# Patient Record
Sex: MALE | Race: WHITE | NOT HISPANIC OR LATINO | Employment: FULL TIME | URBAN - METROPOLITAN AREA
[De-identification: names, ages, dates, MRNs, and addresses within clinical notes are randomized per-mention and may not be internally consistent; named-entity substitution may affect disease eponyms.]

---

## 2017-05-15 ENCOUNTER — HOSPITAL ENCOUNTER (OUTPATIENT)
Dept: CARDIOLOGY | Facility: HOSPITAL | Age: 42
Discharge: HOME | End: 2017-05-15
Attending: INTERNAL MEDICINE | Admitting: INTERNAL MEDICINE

## 2022-06-17 ENCOUNTER — HOSPITAL ENCOUNTER (EMERGENCY)
Facility: HOSPITAL | Age: 47
Discharge: HOME | End: 2022-06-17
Attending: EMERGENCY MEDICINE | Admitting: EMERGENCY MEDICINE

## 2022-06-17 VITALS
SYSTOLIC BLOOD PRESSURE: 137 MMHG | OXYGEN SATURATION: 98 % | WEIGHT: 202.6 LBS | HEIGHT: 69 IN | RESPIRATION RATE: 17 BRPM | DIASTOLIC BLOOD PRESSURE: 101 MMHG | HEART RATE: 98 BPM | BODY MASS INDEX: 30.01 KG/M2 | TEMPERATURE: 98 F

## 2022-06-17 DIAGNOSIS — M51.36 DEGENERATIVE DISC DISEASE, LUMBAR: Primary | ICD-10-CM

## 2022-06-17 DIAGNOSIS — M48.061 SPINAL STENOSIS OF LUMBAR REGION WITHOUT NEUROGENIC CLAUDICATION: ICD-10-CM

## 2022-06-17 PROCEDURE — 99283 EMERGENCY DEPT VISIT LOW MDM: CPT

## 2022-06-17 PROCEDURE — 96372 THER/PROPH/DIAG INJ SC/IM: CPT

## 2022-06-17 PROCEDURE — 25010000002 KETOROLAC TROMETHAMINE PER 15 MG: Performed by: EMERGENCY MEDICINE

## 2022-06-17 RX ORDER — METHOCARBAMOL 500 MG/1
500 TABLET, FILM COATED ORAL 4 TIMES DAILY
Qty: 20 TABLET | Refills: 0 | Status: SHIPPED | OUTPATIENT
Start: 2022-06-17 | End: 2022-06-22

## 2022-06-17 RX ORDER — KETOROLAC TROMETHAMINE 30 MG/ML
60 INJECTION, SOLUTION INTRAMUSCULAR; INTRAVENOUS ONCE
Status: COMPLETED | OUTPATIENT
Start: 2022-06-17 | End: 2022-06-17

## 2022-06-17 RX ORDER — HYDROCODONE BITARTRATE AND ACETAMINOPHEN 7.5; 325 MG/1; MG/1
1 TABLET ORAL EVERY 6 HOURS PRN
Qty: 20 TABLET | Refills: 0 | Status: SHIPPED | OUTPATIENT
Start: 2022-06-17

## 2022-06-17 RX ADMIN — KETOROLAC TROMETHAMINE 60 MG: 30 INJECTION, SOLUTION INTRAMUSCULAR at 19:49

## 2022-06-17 NOTE — DISCHARGE INSTRUCTIONS
Hydrocodone for pain.  Robaxin for spasm and pain.  Also take 600 mg of ibuprofen 3 times a day.  No strenuous activity this weekend.  Call the spine center on Monday morning for a follow-up appointment

## 2022-06-17 NOTE — ED PROVIDER NOTES
Subjective   History of Present Illness  Patient developed right lower back pain about 2 weeks ago but does not recall any trauma or activity that would have aggravated his back.  The patient denies any previous history of chronic back pain.  The patient denies any difficulty with urination there is been no change in bowel or bladder habits.  Today he states that the pain has increased in severity and goes to the right leg.  He is still able to walk but his pain severity has increased.  The patient also states that incidentally he has developed a pain in the left upper back which is worse with a deep breath.  He denies any cough or congestion fever or chills.  There is been no nausea or vomiting.  Denies any pain to the chest.  He has had a previous cholecystectomy.  Patient is not a smoker.  Review of Systems  Negative other than noted above  Past Medical History:   Diagnosis Date   • Hyperlipidemia    • Low back problem        No Known Allergies    Past Surgical History:   Procedure Laterality Date   • CHOLECYSTECTOMY         No family history on file.    Social History     Socioeconomic History   • Marital status: Single   Tobacco Use   • Smoking status: Never Smoker   • Smokeless tobacco: Never Used   Vaping Use   • Vaping Use: Never used   Substance and Sexual Activity   • Alcohol use: Not Currently   • Drug use: Defer   • Sexual activity: Defer           Objective   Physical Exam  He is awake and alert he is afebrile vital signs are stable his sats were 97% the HEENT exam was unremarkable neck is supple and nontender his upper back shows no bruising and there is no tenderness his lungs are clear cardiovascular exam reveals a regular rhythm his abdomen was soft the patient has some tenderness noted to the right lower lumbar area.  His reflexes were 2+ and symmetrical.  He does not have any atrophy or loss of strength  Procedures           ED Course      The patient had [facility] done [facility] that  showed evidence of multilevel degenerative disease and very mild spinal stenosis.  There did not appear to be anything that was emergently or imminently surgical in nature.                                           MDM  The patient will be given a muscle relaxant as well and some hydrocodone to use for pain acutely he is to follow-up with the spine center  Final diagnoses:   Degenerative disc disease, lumbar   Spinal stenosis of lumbar region without neurogenic claudication       ED Disposition  ED Disposition     None          No follow-up provider specified.       Medication List      No changes were made to your prescriptions during this visit.          Mario Alberto Renteria MD  06/17/22 3845

## 2022-06-23 PROBLEM — Z82.49 FAMILY HISTORY OF CORONARY ARTERY DISEASE: Status: ACTIVE | Noted: 2017-04-04

## 2022-06-23 PROBLEM — F41.9 ANXIETY DISORDER: Status: ACTIVE | Noted: 2022-06-23

## 2022-06-23 PROBLEM — E78.5 DYSLIPIDEMIA: Status: ACTIVE | Noted: 2017-04-04

## 2022-06-23 NOTE — PROGRESS NOTES
Neurosurgical Consultation      Tal Spring is a 47 y.o. male is being seen for consultation today at the request of Tenisha Melgar MD for back pain.     Chief Complaint   Patient presents with   • Back Pain     Lower back pain difficult to walk for 2 weeks  Follow up on MRI        Previous treatment:    HPI: This is a 47-year-old gentleman who comments that in general he has a relatively minimal activity in his life who presents with 3 weeks of back pain.  This came on suddenly.  He has difficulty performing any activities as well as lying down and reclining.  He has difficulty gaining comfort.  He is gone to his primary care doctor in the emergency department multiple times.  He is undergone Toradol shots.  He has had steroids and opioid pain medicine as well as muscle relaxers prescribed.  He has not had any physical therapy.  He has not had any spinal canal injections.  He does note intermittent right-sided more than left sided electric-like pain into his leg.  This is not in a specific dermatomal pattern.    Past Medical History:   Diagnosis Date   • Hyperlipidemia    • Low back problem         Past Surgical History:   Procedure Laterality Date   • CHOLECYSTECTOMY          Current Outpatient Medications on File Prior to Visit   Medication Sig Dispense Refill   • HYDROcodone-acetaminophen (NORCO) 7.5-325 MG per tablet Take 1 tablet by mouth Every 6 (Six) Hours As Needed for Moderate Pain  for up to 20 doses. 20 tablet 0   • methocarbamol (ROBAXIN) 500 MG tablet Take 1 tablet by mouth 4 (Four) Times a Day. 20 tablet 0   • methylPREDNISolone (MEDROL) 4 MG dose pack Take as directed on package instructions. 21 tablet 0     No current facility-administered medications on file prior to visit.        No Known Allergies     Social History     Socioeconomic History   • Marital status: Single   Tobacco Use   • Smoking status: Never Smoker   • Smokeless tobacco: Never Used   Vaping Use   • Vaping Use: Never used  "  Substance and Sexual Activity   • Alcohol use: Not Currently   • Drug use: Defer   • Sexual activity: Defer          Review of Systems   Constitutional: Negative.    HENT: Negative.    Eyes: Negative.    Respiratory: Negative.    Cardiovascular: Negative.    Gastrointestinal: Negative.    Endocrine: Negative.    Genitourinary: Negative.    Musculoskeletal: Positive for back pain and gait problem.        Gait issue lower back pain   Allergic/Immunologic: Negative.    Hematological: Negative.    Psychiatric/Behavioral: Negative.         Physical Examination:     Vitals:    06/24/22 1020   BP: 130/87   Pulse: 89   Resp: 16   Temp: 98.1 °F (36.7 °C)   SpO2: 97%   Weight: 89.7 kg (197 lb 12.8 oz)   Height: 175.3 cm (69\")        Physical Exam  Eyes:      General: Lids are normal.      Extraocular Movements: Extraocular movements intact.      Pupils: Pupils are equal, round, and reactive to light.   Neurological:      Deep Tendon Reflexes:      Reflex Scores:       Patellar reflexes are 2+ on the right side and 2+ on the left side.       Achilles reflexes are 2+ on the right side and 2+ on the left side.  Psychiatric:         Speech: Speech normal.          Neurological Exam  Mental Status  Awake, alert and oriented to person, place and time. Speech is normal. Language is fluent with no aphasia.    Cranial Nerves  CN II: Visual acuity is normal. Visual fields full to confrontation.  CN III, IV, VI: Extraocular movements intact bilaterally. Normal lids and orbits bilaterally. Pupils equal round and reactive to light bilaterally.  CN V: Facial sensation is normal.  CN VII: Full and symmetric facial movement.  CN IX, X: Palate elevates symmetrically. Normal gag reflex.  CN XI: Shoulder shrug strength is normal.  CN XII: Tongue midline without atrophy or fasciculations.    Motor  Normal muscle bulk throughout. No fasciculations present. Normal muscle tone.  4+ out of 5 strength in all muscle groups of of the bilateral legs.  " Winces with movement..    Sensory  Light touch is normal in upper and lower extremities.     Reflexes                                            Right                      Left  Patellar                                2+                         2+  Achilles                                2+                         2+    Right pathological reflexes: Talia's absent.  Left pathological reflexes: Talia's absent.       Result Review  The following data was reviewed by: Dain Block MD on 06/24/2022:    Data reviewed: Radiologic studies MRI of the lumbar spine was personally reviewed and reveals no significant central canal or neuroforaminal stenosis.  No significant vertebral body height loss or disc height loss.  Appears to be good lumbar lordosis.     Assessment/plan:  Is a 47-year-old gentleman with a back strain without clear radiculopathy or neurogenic claudication.  His MRI is relatively unremarkable.  I recommend a full course of physical therapy.  It is important that he engage with this fully and incorporate this into his life for improvement in his current symptoms and lessening of the chance of future back strains.  He may follow-up with me in an as-needed basis.  I have encouraged him to call with any questions or concerns.    Diagnoses and all orders for this visit:    1. Back strain, initial encounter (Primary)  -     Ambulatory Referral to Physical Therapy Evaluate and treat         Return if symptoms worsen or fail to improve.            Dain Block MD

## 2022-06-24 ENCOUNTER — OFFICE VISIT (OUTPATIENT)
Dept: NEUROSURGERY | Facility: CLINIC | Age: 47
End: 2022-06-24

## 2022-06-24 ENCOUNTER — PATIENT ROUNDING (BHMG ONLY) (OUTPATIENT)
Dept: NEUROSURGERY | Facility: CLINIC | Age: 47
End: 2022-06-24

## 2022-06-24 VITALS
HEART RATE: 89 BPM | OXYGEN SATURATION: 97 % | TEMPERATURE: 98.1 F | BODY MASS INDEX: 29.3 KG/M2 | RESPIRATION RATE: 16 BRPM | WEIGHT: 197.8 LBS | SYSTOLIC BLOOD PRESSURE: 130 MMHG | HEIGHT: 69 IN | DIASTOLIC BLOOD PRESSURE: 87 MMHG

## 2022-06-24 DIAGNOSIS — S39.012A BACK STRAIN, INITIAL ENCOUNTER: Primary | ICD-10-CM

## 2022-06-24 PROCEDURE — 99204 OFFICE O/P NEW MOD 45 MIN: CPT | Performed by: NEUROLOGICAL SURGERY

## 2022-07-11 ENCOUNTER — TELEPHONE (OUTPATIENT)
Dept: PHYSICAL THERAPY | Facility: CLINIC | Age: 47
End: 2022-07-11

## 2022-10-05 ENCOUNTER — TELEPHONE (OUTPATIENT)
Dept: NEUROSURGERY | Facility: CLINIC | Age: 47
End: 2022-10-05

## 2022-10-05 DIAGNOSIS — S39.012A BACK STRAIN, INITIAL ENCOUNTER: Primary | ICD-10-CM

## 2022-10-05 NOTE — TELEPHONE ENCOUNTER
Caller: Tal Spring    Relationship: Self    Best call back number: [phone]    What specialty or service is being requested: PHY THERAPY    What is the provider, practice or medical service name:     What is the office location: [place]      Any additional details: PT STATES HE NEVER DID START PHY THERAPY AND NOW THE REFERRAL HAS . PT WOULD LIKE NEW REFERRAL TO PHY THERAPY PUT IN HIS CHART.     PLEASE CALL PT TO ADVISE WHEN REFERRAL IS READY, HE DOES PAY ATTENTION TO MYCHART.     THANK YOU,

## 2022-10-27 ENCOUNTER — TREATMENT (OUTPATIENT)
Dept: PHYSICAL THERAPY | Facility: CLINIC | Age: 47
End: 2022-10-27

## 2022-10-27 DIAGNOSIS — S39.012A BACK STRAIN, INITIAL ENCOUNTER: Primary | ICD-10-CM

## 2022-10-27 DIAGNOSIS — R29.3 ABNORMAL POSTURE: ICD-10-CM

## 2022-10-27 PROCEDURE — 97162 PT EVAL MOD COMPLEX 30 MIN: CPT | Performed by: PHYSICAL THERAPIST

## 2022-10-27 NOTE — PROGRESS NOTES
Physical Therapy Initial Evaluation and Plan of Care  [address] [address]    Patient: Tal Spring   : 1975  Diagnosis/ICD-10 Code:  Back strain, initial encounter [S39.012A]  Referring practitioner: Dain Block MD  Date of Initial Visit: 10/27/2022  Today's Date: 10/27/2022  Patient seen for 1 sessions         Visit Diagnoses:    ICD-10-CM ICD-9-CM   1. Back strain, initial encounter  S39.012A 847.9   2. Abnormal posture  R29.3 781.92       Subjective Questionnaire: Oswestry: 34%      Subjective Evaluation    History of Present Illness  Mechanism of injury: Patient reports that he has been having low back pain since 2022. He had an MRI which showed L2-3 disc protrusion which is encroaching on the L2 nerve. He reports that some days are better than others, he seems to be getting used to the pain but he is very careful. He reports that he has a tempurpedic mattress and is a stomach sleeper, believes that his mattress could be the cause.     Patient reports that pain is located in the lower back more on the right side versus left. Patient states that the pain can go down the right leg and makes it difficult for him to walk some days.    Sitting for long periods of time, walking for long periods of time, standing for long periods of time, driving, exercising, and sleeping throughout the night tends to aggravate symptoms into the lower back. Heat and hot baths tend to reduce symptoms into the lower back.     Patient reports that he will get occasional numbness/tingling and shock pain in the lower extremity. He reports that his physician wants him to try PT first and will follow up after.      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  Quality of life: good    Pain  Current pain ratin  At best pain ratin  At worst pain ratin  Quality: throbbing and dull ache  Relieving factors: heat  Aggravating factors: ambulation,  squatting, lifting, prolonged positioning, movement, standing and sleeping  Progression: no change    Social Support  Lives in: one-story house (step up)  Lives with: alone    Hand dominance: right    Diagnostic Tests  MRI studies: abnormal    Patient Goals  Patient goals for therapy: decreased pain, improved balance, increased motion, increased strength, independence with ADLs/IADLs and return to sport/leisure activities             Objective          Static Posture     Thoracic Spine  Hyperkyphosis.    Lumbar Spine   Flattened.     Postural Observations  Standing posture: poor        Palpation   Left   Hypertonic in the erector spinae and lumbar paraspinals.   Tenderness of the erector spinae and lumbar paraspinals.     Right   Hypertonic in the erector spinae and lumbar paraspinals. Tenderness of the erector spinae and lumbar paraspinals.     Neurological Testing     Sensation     Lumbar   Left   Intact: light touch    Right   Intact: light touch    Active Range of Motion     Additional Active Range of Motion Details  50% limited into flexion-decreased HS extensibility; 75% limited into extension-extremely stiff from L1-C7 hinging from L2-3    Strength/Myotome Testing     Left Hip   Planes of Motion   Flexion: 5  Abduction: 4-    Right Hip   Planes of Motion   Flexion: 5  Abduction: 4-    Left Knee   Flexion: 4+  Extension: 5    Right Knee   Flexion: 4+  Extension: 5    Left Ankle/Foot   Dorsiflexion: 5    Right Ankle/Foot   Dorsiflexion: 5    Tests       Thoracic   Negative slump.     Lumbar   Negative repeated extension and repeated flexion.     Additional Tests Details  Distraction with mobilization belt: did not change symptoms    Lumbar Flexibility Comments:   Limited HS extensibility          Assessment & Plan     Assessment  Impairments: abnormal coordination, abnormal muscle firing, abnormal muscle tone, abnormal or restricted ROM, activity intolerance, impaired physical strength, lacks appropriate home  exercise program and pain with function  Functional Limitations: carrying objects, lifting, sleeping, walking, uncomfortable because of pain, sitting, standing and stooping  Assessment details: The patient is a 47 y.o. male who presents to physical therapy today with orders to address low back pain. Upon initial evaluation, the patient demonstrates the following impairments: increased pain, increased muscle tone, abnormal posture, decreased strength, decreased joint mobility, and decreased ROM. Due to these impairments, the patient is unable to stand, sit, or walk for long periods of time and sleep throughout the night without an increase in symptoms. The patient would benefit from skilled PT services to address functional limitations and impairments and to improve patient quality of life.      Prognosis: good    Goals  Plan Goals: STG's: 2 weeks  Patient will report a decrease in pain by 25%   Patient will be able to pick a light (<3#) object off the floor without an increase in pain  Patient will be able to perform HEP with minimal verbal cues    LTG's: By discharge  Patient will report a decrease in pain by 75%  Patient will report an elimination of radicular symptoms  Patient will demonstrate an improvement in overall function as measured by an improvement in the Oswestry Disability Index score  Patient will be able to sleep > 5 hours without waking from pain  Patient will be able to tolerate standing for > 45 minutes without increased pain  Patient will be able to tolerate sitting for > 45 minutes without increased pain  Patient will be able to ambulate community distances without increased pain  Patient will be independent with final HEP       Plan  Therapy options: will be seen for skilled therapy services  Planned modality interventions: cryotherapy, electrical stimulation/Russian stimulation, TENS, thermotherapy (hydrocollator packs), dry needling and traction  Planned therapy interventions: abdominal trunk  stabilization, ADL retraining, balance/weight-bearing training, flexibility, functional ROM exercises, home exercise program, IADL retraining, joint mobilization, manual therapy, neuromuscular re-education, postural training, soft tissue mobilization, spinal/joint mobilization, strengthening, stretching and therapeutic activities  Frequency: 2x week  Duration in visits: 14  Duration in weeks: 7  Treatment plan discussed with: patient        History # of Personal Factors and/or Comorbidities: MODERATE (1-2)  Examination of Body System(s): # of elements: MODERATE (3)  Clinical Presentation: EVOLVING  Clinical Decision Making: MODERATE      Timed:         Manual Therapy:         mins  86421;     Therapeutic Exercise:    5     mins  11452;     Neuromuscular Danilo:        mins  55933;    Therapeutic Activity:          mins  63233;     Gait Training:           mins  54597;     Ultrasound:          mins  23016;    Ionto                                   mins   01480  Self Care                            mins   75984  Canalith Repos         mins 58086      Un-Timed:  Electrical Stimulation:         mins  71121 ( );  Dry Needling          mins self-pay  Traction          mins 89169  Low Eval          Mins  32473  Mod Eval     30     Mins  63785  High Eval                            Mins  52618        Timed Treatment:   5   mins   Total Treatment:     35   mins      PT SIGNATURE: Tsering Preston PT   [place] License # [id number]  DATE TREATMENT INITIATED: 10/27/2022    Initial Certification  Certification Period: 10/27/2022 thru 1/24/2023  I certify that the therapy services are furnished while this patient is under my care.  The services outlined above are required by this patient, and will be reviewed every 90 days.  Physician Signature: ________________________________________________________________________   PHYSICIAN: Dain Block MD      DATE: _________________    Please sign and return via fax to [phone]..  Thank you, [facility].

## 2022-10-31 ENCOUNTER — TREATMENT (OUTPATIENT)
Dept: PHYSICAL THERAPY | Facility: CLINIC | Age: 47
End: 2022-10-31

## 2022-10-31 DIAGNOSIS — S39.012A BACK STRAIN, INITIAL ENCOUNTER: Primary | ICD-10-CM

## 2022-10-31 DIAGNOSIS — R29.3 ABNORMAL POSTURE: ICD-10-CM

## 2022-10-31 PROCEDURE — 97110 THERAPEUTIC EXERCISES: CPT | Performed by: PHYSICAL THERAPIST

## 2022-10-31 PROCEDURE — 97112 NEUROMUSCULAR REEDUCATION: CPT | Performed by: PHYSICAL THERAPIST

## 2022-10-31 PROCEDURE — 97140 MANUAL THERAPY 1/> REGIONS: CPT | Performed by: PHYSICAL THERAPIST

## 2022-10-31 NOTE — PROGRESS NOTES
Physical Therapy Daily Treatment Note    [address] [place] [address]    Patient:  Tal Spring  :  1975  Referring practitioner:  Dain Block MD  Date of Initial Visit:  Type: THERAPY  Noted: 10/27/2022  Today's Date:  10/31/2022      Visit Diagnoses:    ICD-10-CM ICD-9-CM   1. Back strain, initial encounter  S39.012A 847.9   2. Abnormal posture  R29.3 781.92       VISIT#:  2 in POC.    Subjective   Tal Spring reports he felt okay after his initial eval last visit; no sig change in status of his symptoms thus far.  Has been completing HEP without problems.  Pain noted in R-sided LB with symptoms down R buttock and into R hamstring region at start of session.      Objective   See exercise, manual, and modality logs for complete treatment.       ASSESSMENT  Response to manual in treatment log.  Pt tolerated new exercises / activities without problems.  Cues as noted.  No complications today.      PLAN  Continue current POC and progress as tolerated per PT evaluation.        Timed:  Therapeutic Exercise:    18     mins  26308;     Neuromuscular Danilo:    11    mins  97470;    Manual Therapy:    10     mins  17311;     Timed Treatment:   39   mins   Total Treatment:     39   mins      Sebastien Mckenzie, PT  [place] License # [id number]  Physical Therapist

## 2022-11-07 ENCOUNTER — TELEPHONE (OUTPATIENT)
Dept: PHYSICAL THERAPY | Facility: CLINIC | Age: 47
End: 2022-11-07

## 2022-11-07 NOTE — TELEPHONE ENCOUNTER
Patient called and left message on voicemail.  He requested to cancel all remaining PT visits because he was going to a chiropractor instead.

## 2022-11-08 ENCOUNTER — DOCUMENTATION (OUTPATIENT)
Dept: PHYSICAL THERAPY | Facility: CLINIC | Age: 47
End: 2022-11-08

## 2022-11-08 NOTE — PROGRESS NOTES
Discharge Summary  Discharge Summary from Physical Therapy Report      Dates  PT visit: 10/27/2022 to 10/31/2022  Number of Visits: 2     Discharge Status of Patient: discharged    Goals: Not Met    Discharge Plan: Continue with current home exercise program as instructed    Comments: The patient is being discharged today due to patient self discharge from PT program. Patient stated he is seeing a chiropractor instead and can be d/c from therapy.    Date of Discharge 11/8/2022        Tsering Preston, PT  Physical Therapist

## 2023-10-23 ENCOUNTER — HOSPITAL ENCOUNTER (OUTPATIENT)
Facility: HOSPITAL | Age: 48
Setting detail: OBSERVATION
Discharge: HOME | End: 2023-10-24
Attending: EMERGENCY MEDICINE | Admitting: EMERGENCY MEDICINE
Payer: COMMERCIAL

## 2023-10-23 ENCOUNTER — APPOINTMENT (OUTPATIENT)
Dept: GENERAL RADIOLOGY | Facility: HOSPITAL | Age: 48
End: 2023-10-23
Payer: COMMERCIAL

## 2023-10-23 DIAGNOSIS — R07.9 CHEST PAIN, UNSPECIFIED TYPE: Primary | ICD-10-CM

## 2023-10-23 LAB
ALBUMIN SERPL-MCNC: 4.4 G/DL (ref 3.5–5.2)
ALBUMIN/GLOB SERPL: 1.5 G/DL
ALP SERPL-CCNC: 56 U/L (ref 39–117)
ALT SERPL W P-5'-P-CCNC: 21 U/L (ref 1–41)
ANION GAP SERPL CALCULATED.3IONS-SCNC: 9 MMOL/L (ref 5–15)
AST SERPL-CCNC: 16 U/L (ref 1–40)
BASOPHILS # BLD AUTO: 0 10*3/MM3 (ref 0–0.2)
BASOPHILS NFR BLD AUTO: 0.7 % (ref 0–1.5)
BILIRUB SERPL-MCNC: 0.4 MG/DL (ref 0–1.2)
BUN SERPL-MCNC: 14 MG/DL (ref 6–20)
BUN/CREAT SERPL: 16.5 (ref 7–25)
CALCIUM SPEC-SCNC: 9.3 MG/DL (ref 8.6–10.5)
CHLORIDE SERPL-SCNC: 106 MMOL/L (ref 98–107)
CO2 SERPL-SCNC: 25 MMOL/L (ref 22–29)
CREAT SERPL-MCNC: 0.85 MG/DL (ref 0.76–1.27)
DEPRECATED RDW RBC AUTO: 39.4 FL (ref 37–54)
EGFRCR SERPLBLD CKD-EPI 2021: 106.5 ML/MIN/1.73
EOSINOPHIL # BLD AUTO: 0.2 10*3/MM3 (ref 0–0.4)
EOSINOPHIL NFR BLD AUTO: 2.5 % (ref 0.3–6.2)
ERYTHROCYTE [DISTWIDTH] IN BLOOD BY AUTOMATED COUNT: 12.9 % (ref 12.3–15.4)
GEN 5 2HR TROPONIN T REFLEX: <6 NG/L
GLOBULIN UR ELPH-MCNC: 2.9 GM/DL
GLUCOSE SERPL-MCNC: 89 MG/DL (ref 65–99)
HCT VFR BLD AUTO: 46.7 % (ref 37.5–51)
HGB BLD-MCNC: 15.3 G/DL (ref 13–17.7)
HOLD SPECIMEN: NORMAL
HOLD SPECIMEN: NORMAL
LYMPHOCYTES # BLD AUTO: 1.6 10*3/MM3 (ref 0.7–3.1)
LYMPHOCYTES NFR BLD AUTO: 24.4 % (ref 19.6–45.3)
MCH RBC QN AUTO: 29 PG (ref 26.6–33)
MCHC RBC AUTO-ENTMCNC: 32.7 G/DL (ref 31.5–35.7)
MCV RBC AUTO: 88.6 FL (ref 79–97)
MONOCYTES # BLD AUTO: 0.6 10*3/MM3 (ref 0.1–0.9)
MONOCYTES NFR BLD AUTO: 8.9 % (ref 5–12)
NEUTROPHILS NFR BLD AUTO: 4.1 10*3/MM3 (ref 1.7–7)
NEUTROPHILS NFR BLD AUTO: 63.5 % (ref 42.7–76)
NRBC BLD AUTO-RTO: 0.1 /100 WBC (ref 0–0.2)
PLATELET # BLD AUTO: 312 10*3/MM3 (ref 140–450)
PMV BLD AUTO: 8.2 FL (ref 6–12)
POTASSIUM SERPL-SCNC: 4 MMOL/L (ref 3.5–5.2)
PROT SERPL-MCNC: 7.3 G/DL (ref 6–8.5)
RBC # BLD AUTO: 5.27 10*6/MM3 (ref 4.14–5.8)
SODIUM SERPL-SCNC: 140 MMOL/L (ref 136–145)
TROPONIN T DELTA: NORMAL
TROPONIN T SERPL HS-MCNC: <6 NG/L
WBC NRBC COR # BLD: 6.4 10*3/MM3 (ref 3.4–10.8)
WHOLE BLOOD HOLD COAG: NORMAL
WHOLE BLOOD HOLD SPECIMEN: NORMAL

## 2023-10-23 PROCEDURE — G0378 HOSPITAL OBSERVATION PER HR: HCPCS

## 2023-10-23 PROCEDURE — 93005 ELECTROCARDIOGRAM TRACING: CPT

## 2023-10-23 PROCEDURE — 71045 X-RAY EXAM CHEST 1 VIEW: CPT

## 2023-10-23 PROCEDURE — 84484 ASSAY OF TROPONIN QUANT: CPT | Performed by: PHYSICIAN ASSISTANT

## 2023-10-23 PROCEDURE — 93005 ELECTROCARDIOGRAM TRACING: CPT | Performed by: EMERGENCY MEDICINE

## 2023-10-23 PROCEDURE — 99203 OFFICE O/P NEW LOW 30 MIN: CPT | Performed by: INTERNAL MEDICINE

## 2023-10-23 PROCEDURE — 85025 COMPLETE CBC W/AUTO DIFF WBC: CPT | Performed by: PHYSICIAN ASSISTANT

## 2023-10-23 PROCEDURE — 80053 COMPREHEN METABOLIC PANEL: CPT | Performed by: PHYSICIAN ASSISTANT

## 2023-10-23 PROCEDURE — 99284 EMERGENCY DEPT VISIT MOD MDM: CPT

## 2023-10-23 RX ORDER — MELATONIN
1000 DAILY
COMMUNITY

## 2023-10-23 RX ORDER — CHOLECALCIFEROL (VITAMIN D3) 125 MCG
5 CAPSULE ORAL NIGHTLY PRN
Status: DISCONTINUED | OUTPATIENT
Start: 2023-10-23 | End: 2023-10-24 | Stop reason: HOSPADM

## 2023-10-23 RX ORDER — SODIUM CHLORIDE 9 MG/ML
40 INJECTION, SOLUTION INTRAVENOUS AS NEEDED
Status: DISCONTINUED | OUTPATIENT
Start: 2023-10-23 | End: 2023-10-24 | Stop reason: HOSPADM

## 2023-10-23 RX ORDER — SODIUM CHLORIDE 0.9 % (FLUSH) 0.9 %
10 SYRINGE (ML) INJECTION AS NEEDED
Status: DISCONTINUED | OUTPATIENT
Start: 2023-10-23 | End: 2023-10-24 | Stop reason: HOSPADM

## 2023-10-23 RX ORDER — POLYETHYLENE GLYCOL 3350 17 G/17G
17 POWDER, FOR SOLUTION ORAL DAILY PRN
Status: DISCONTINUED | OUTPATIENT
Start: 2023-10-23 | End: 2023-10-24 | Stop reason: HOSPADM

## 2023-10-23 RX ORDER — ACETAMINOPHEN 325 MG/1
650 TABLET ORAL EVERY 4 HOURS PRN
Status: DISCONTINUED | OUTPATIENT
Start: 2023-10-23 | End: 2023-10-24 | Stop reason: HOSPADM

## 2023-10-23 RX ORDER — ATORVASTATIN CALCIUM 20 MG/1
20 TABLET, FILM COATED ORAL NIGHTLY
COMMUNITY

## 2023-10-23 RX ORDER — AMOXICILLIN 250 MG
2 CAPSULE ORAL 2 TIMES DAILY
Status: DISCONTINUED | OUTPATIENT
Start: 2023-10-23 | End: 2023-10-24 | Stop reason: HOSPADM

## 2023-10-23 RX ORDER — ASPIRIN 81 MG/1
324 TABLET, CHEWABLE ORAL ONCE
Status: COMPLETED | OUTPATIENT
Start: 2023-10-23 | End: 2023-10-23

## 2023-10-23 RX ORDER — SODIUM CHLORIDE 0.9 % (FLUSH) 0.9 %
10 SYRINGE (ML) INJECTION EVERY 12 HOURS SCHEDULED
Status: DISCONTINUED | OUTPATIENT
Start: 2023-10-23 | End: 2023-10-24 | Stop reason: HOSPADM

## 2023-10-23 RX ORDER — BISACODYL 5 MG/1
5 TABLET, DELAYED RELEASE ORAL DAILY PRN
Status: DISCONTINUED | OUTPATIENT
Start: 2023-10-23 | End: 2023-10-24 | Stop reason: HOSPADM

## 2023-10-23 RX ORDER — ONDANSETRON 2 MG/ML
4 INJECTION INTRAMUSCULAR; INTRAVENOUS EVERY 6 HOURS PRN
Status: DISCONTINUED | OUTPATIENT
Start: 2023-10-23 | End: 2023-10-24 | Stop reason: HOSPADM

## 2023-10-23 RX ORDER — BISACODYL 10 MG
10 SUPPOSITORY, RECTAL RECTAL DAILY PRN
Status: DISCONTINUED | OUTPATIENT
Start: 2023-10-23 | End: 2023-10-24 | Stop reason: HOSPADM

## 2023-10-23 RX ADMIN — ASPIRIN 81 MG CHEWABLE TABLET 324 MG: 81 TABLET CHEWABLE at 14:15

## 2023-10-23 RX ADMIN — ACETAMINOPHEN 650 MG: 325 TABLET, FILM COATED ORAL at 21:29

## 2023-10-23 RX ADMIN — Medication 10 ML: at 21:29

## 2023-10-23 RX ADMIN — NITROGLYCERIN 1 INCH: 20 OINTMENT TOPICAL at 14:15

## 2023-10-23 NOTE — PLAN OF CARE
Goal Outcome Evaluation:  Plan of Care Reviewed With: patient           Outcome Evaluation: patient admitted for cp. first troponin normal. Second one drawn, sent to lab. Healthy heart diet. NPO at midnight. A&O x4.  Up ad jane. Echo ordered. Stress test 10/24 in AM. Cardiology consulted ([facility])/

## 2023-10-23 NOTE — ED PROVIDER NOTES
Subjective   History of Present Illness  Patient is a 48-year-old male who presents with intermittent chest pain worse over the last several days over the last week.  He reports fairly constant now.  He reports some radiation to his left arm.  He has a family history of early MI his father  of a cardiac heart attack at 47 years old.  In the past he seen Dr. Howard but its been at least since 2017.        Review of Systems   Constitutional:  Negative for chills, diaphoresis, fatigue and fever.   HENT:  Negative for congestion, postnasal drip, rhinorrhea, sinus pressure and voice change.    Respiratory:  Negative for cough, choking, chest tightness, shortness of breath, wheezing and stridor.    Cardiovascular:  Positive for chest pain. Negative for palpitations.   Gastrointestinal:  Negative for abdominal distention, abdominal pain, nausea and vomiting.   Musculoskeletal: Negative.    Neurological: Negative.    Psychiatric/Behavioral: Negative.         Past Medical History:   Diagnosis Date    Hyperlipidemia     Injury of back     Low back problem        No Known Allergies    Past Surgical History:   Procedure Laterality Date    CHOLECYSTECTOMY         No family history on file.    Social History     Socioeconomic History    Marital status: Single   Tobacco Use    Smoking status: Never    Smokeless tobacco: Never   Vaping Use    Vaping Use: Never used   Substance and Sexual Activity    Alcohol use: Not Currently    Drug use: Defer    Sexual activity: Defer           Objective   Physical Exam  Vitals and nursing note reviewed.   Constitutional:       Appearance: Normal appearance. He is well-developed.   HENT:      Head: Normocephalic and atraumatic.      Nose: Nose normal.   Eyes:      Conjunctiva/sclera: Conjunctivae normal.   Cardiovascular:      Rate and Rhythm: Normal rate and regular rhythm.   Pulmonary:      Effort: Pulmonary effort is normal. No respiratory distress.      Breath sounds: Normal breath sounds.  "No stridor. No wheezing, rhonchi or rales.   Musculoskeletal:         General: Normal range of motion.      Cervical back: Normal range of motion.   Skin:     General: Skin is warm and dry.   Neurological:      General: No focal deficit present.      Mental Status: He is alert and oriented to person, place, and time. Mental status is at baseline.   Psychiatric:         Mood and Affect: Mood normal.         Behavior: Behavior normal.         Thought Content: Thought content normal.         Judgment: Judgment normal.         Procedures           ED Course  ED Course as of 10/23/23 1549   Mon Oct 23, 2023   1521 EKG interpreted by ER physician reviewed by myself.  Sinus rhythm rate of 80 [MG]      ED Course User Index  [MG] Carmen Gaspar PA-C                                           Medical Decision Making  Appropriate PPE worn during exam.    /90 (BP Location: Right arm)   Pulse 76   Temp 97.7 °F (36.5 °C)   Resp 17   Ht 175.3 cm (69\")   Wt 97.1 kg (214 lb)   SpO2 95%   BMI 31.60 kg/m²      Co-morbidities --  has a past medical history of Hyperlipidemia, Injury of back, and Low back problem.  Radiology interpretation --  X-rays reviewed by me and interpreted by radiologist:  XR Chest 1 View    Result Date: 10/23/2023  Impression: No acute process identified. Electronically Signed: Adeel Moreno MD  10/23/2023 1:38 PM CDT  Workstation ID: [id number]    She presents with chest pain.  Chest pain relieved with Nitropaste and aspirin blood work and EKG fairly unremarkable.  He was offered observation admittance which she accepted.  Stable in the ER in agreement with plan.  I discussed the findings and recommendations with the patient who voices understanding. Stable while in the ER.     Note Disclaimer: At [facility], we believe that sharing information builds trust and better relationships. You are receiving this note because you are receiving care at [facility] or recently visited. It is possible " you will see health information before a provider has talked with you about it. This kind of information can be easy to misunderstand. To help you fully understand what it means for your health, we urge you to discuss this note with your provider.        Problems Addressed:  Chest pain, unspecified type: complicated acute illness or injury    Amount and/or Complexity of Data Reviewed  Labs: ordered.  Radiology: ordered.  ECG/medicine tests: ordered.    Risk  OTC drugs.  Prescription drug management.  Decision regarding hospitalization.        Final diagnoses:   Chest pain, unspecified type       ED Disposition  ED Disposition       ED Disposition   Decision to Admit    Condition   --    Comment   --               No follow-up provider specified.       Medication List      No changes were made to your prescriptions during this visit.            Carmen Gaspar PA-C  10/23/23 8154

## 2023-10-23 NOTE — H&P
[facility] Observation Unit H&P    Patient Name: Tal Spring  : 1975  MRN: [id number]  Primary Care Physician: Tenisha Melgar MD  Date of admission: 10/23/2023     Patient Care Team:  Tenisha Melgar MD as PCP - Na James MD as Consulting Physician (Cardiology)  Dain Block MD as Consulting Physician (Neurosurgery)          Subjective   History Present Illness     Chief Complaint:   Chief Complaint   Patient presents with    Chest Pain     Chest pain, numbness in left arm pain for 2 days.            Mr. Spring is a 48 y.o.  presents to [facility] complaining of chest pain x 4 days      History of Present Illness    Er note 10/23/23 copied: Patient is a 48-year-old male who presents with intermittent chest pain worse over the last several days over the last week.  He reports fairly constant now.  He reports some radiation to his left arm.  He has a family history of early MI his father  of a cardiac heart attack at 47 years old.  In the past he seen Dr. Howard but its been at least since 2017.     10/23/23 obs note: Endorses above history.  He states he was recently diagnosed with hyperlipidemia couple weeks ago but has not yet started his statin.  He states he is also pending evaluation for sleep apnea due to daytime fatigue and snoring but has not yet had a sleep study.    Review of Systems   Constitutional: Negative for fever.   HENT:  Negative for congestion.    Cardiovascular:  Positive for chest pain.   Respiratory:  Negative for cough.    Musculoskeletal:  Negative for back pain.   Gastrointestinal:  Negative for abdominal pain.   Psychiatric/Behavioral:  Positive for depression.             Personal History     Past Medical History:   Past Medical History:   Diagnosis Date    Hyperlipidemia     Injury of back     Low back problem        Surgical History:      Past Surgical History:   Procedure Laterality Date    CHOLECYSTECTOMY             Family History: family  history is not on file. Otherwise pertinent FHx was reviewed and unremarkable.     Social History:  reports that he has never smoked. He has never used smokeless tobacco. He reports that he does not currently use alcohol. Drug use questions deferred to the physician.      Medications:  Prior to Admission medications    Medication Sig Start Date End Date Taking? Authorizing Provider   Cholecalciferol 25 MCG (1000 UT) tablet Take 1 tablet by mouth Daily.   Yes ProviderJohanna MD   atorvastatin (LIPITOR) 20 MG tablet Take 1 tablet by mouth Every Night.    Provider, MD Johanna   albuterol sulfate  (90 Base) MCG/ACT inhaler Inhale 2 puffs Every 4 (Four) Hours As Needed for Wheezing. 1/18/23 10/23/23  Amelia Villalpando APRN   HYDROcodone-acetaminophen (NORCO) 7.5-325 MG per tablet Take 1 tablet by mouth Every 6 (Six) Hours As Needed for Moderate Pain  for up to 20 doses. 6/17/22 10/23/23  Mario Alberto Renteria MD   methocarbamol (ROBAXIN) 500 MG tablet Take 1 tablet by mouth 4 (Four) Times a Day. 6/3/22 10/23/23  Mikayla Leonard APRN   methylPREDNISolone (MEDROL) 4 MG dose pack Take as directed on package instructions. 6/3/22 10/23/23  Mikayla Leonard APRN       Allergies:  No Known Allergies    Objective   Objective     Vital Signs  Temp:  [97.7 °F (36.5 °C)-98.2 °F (36.8 °C)] 98.2 °F (36.8 °C)  Heart Rate:  [70-87] 70  Resp:  [16-17] 16  BP: (126-152)/(78-90) 136/80  SpO2:  [95 %-97 %] 95 %  on   ;   Device (Oxygen Therapy): room air  Body mass index is 31.6 kg/m².    Physical Exam    Vital signs and triage nurse note reviewed.  Constitutional: Awake, alert; well-developed and well-nourished. No acute distress is noted.  HEENT: Normocephalic, atraumatic; pupils are PERRL with intact EOM; oropharynx is pink and moist without exudate or erythema.  Neck: Supple, full range of motion without pain; no jvd  Cardiovascular: Regular rate and rhythm, normal S1-S2.  Pulmonary: Respiratory effort  regular nonlabored, breath sounds clear to auscultation all fields.  Abdomen: Soft, nontender nondistended with normoactive bowel sounds; no rebound or guarding.  Musculoskeletal: Independent range of motion of all extremities with no palpable tenderness or edema.  Neuro: Alert oriented x3, speech is clear and appropriate, GCS 15  Skin:  Fleshtone warm, dry, intact;        Results Review:  I have personally reviewed most recent cardiac tracings, lab results, and radiology images and interpretations and agree with findings, most notably: trop neg.    Results from last 7 days   Lab Units 10/23/23  1409   WBC 10*3/mm3 6.40   HEMOGLOBIN g/dL 15.3   HEMATOCRIT % 46.7   PLATELETS 10*3/mm3 312     Results from last 7 days   Lab Units 10/23/23  1409   SODIUM mmol/L 140   POTASSIUM mmol/L 4.0   CHLORIDE mmol/L 106   CO2 mmol/L 25.0   BUN mg/dL 14   CREATININE mg/dL 0.85   GLUCOSE mg/dL 89   CALCIUM mg/dL 9.3   ALK PHOS U/L 56   ALT (SGPT) U/L 21   AST (SGOT) U/L 16   HSTROP T ng/L <6     Estimated Creatinine Clearance: 120.9 mL/min (by C-G formula based on SCr of 0.85 mg/dL).  Brief Urine Lab Results       None            Microbiology Results (last 10 days)       ** No results found for the last 240 hours. **            ECG/EMG Results (most recent)       Procedure Component Value Units Date/Time    ECG 12 Lead Chest Pain [[id number]] Collected: 10/23/23 1354     Updated: 10/23/23 1355     QT Interval 351 ms      QTC Interval 405 ms     Narrative:      HEART RATE= 80  bpm  RR Interval= 752  ms  CO Interval= 145  ms  P Horizontal Axis= 11  deg  P Front Axis= 31  deg  QRSD Interval= 85  ms  QT Interval= 351  ms  QTcB= 405  ms  QRS Axis= 7  deg  T Wave Axis= 27  deg  - NORMAL ECG -  Sinus rhythm  No previous ECG available for comparison  Electronically Signed By:   Date and Time of Study: 2023-10-23 13:54:41                    XR Chest 1 View    Result Date: 10/23/2023  Impression: No acute process identified. Electronically  Signed: Adeel Moreno MD  10/23/2023 1:38 PM CDT  Workstation ID: [id number]       Estimated Creatinine Clearance: 120.9 mL/min (by C-G formula based on SCr of 0.85 mg/dL).    Assessment & Plan   Assessment/Plan       Active Hospital Problems    Diagnosis  POA    **Chest pain [R07.9]  Yes      Resolved Hospital Problems   No resolved problems to display.         Chest pain  - stress/echo/cards consult  - EKG SR  - trop neg    ? HLD  - check lipid/A1c    Obesity  Bmi > 30; counseled          VTE Prophylaxis -   Mechanical Order History:        Ordered        10/23/23 1615  Place Sequential Compression Device  Once            10/23/23 1615  Maintain Sequential Compression Device  Continuous                          Pharmalogical Order History:       None            CODE STATUS:    Code Status and Medical Interventions:   Ordered at: 10/23/23 1615     Code Status (Patient has no pulse and is not breathing):    CPR (Attempt to Resuscitate)     Medical Interventions (Patient has pulse or is breathing):    Full Support       This patient has been examined wearing personal protective equipment.     I discussed the patient's findings and my recommendations with patient.      Signature:Electronically signed by NAGA Rodriguez, 10/23/23, 4:20 PM EDT.

## 2023-10-24 ENCOUNTER — APPOINTMENT (OUTPATIENT)
Dept: NUCLEAR MEDICINE | Facility: HOSPITAL | Age: 48
End: 2023-10-24
Payer: COMMERCIAL

## 2023-10-24 ENCOUNTER — APPOINTMENT (OUTPATIENT)
Dept: CARDIOLOGY | Facility: HOSPITAL | Age: 48
End: 2023-10-24
Payer: COMMERCIAL

## 2023-10-24 VITALS
WEIGHT: 214 LBS | BODY MASS INDEX: 31.7 KG/M2 | RESPIRATION RATE: 12 BRPM | HEART RATE: 75 BPM | HEIGHT: 69 IN | TEMPERATURE: 97.9 F | SYSTOLIC BLOOD PRESSURE: 117 MMHG | DIASTOLIC BLOOD PRESSURE: 75 MMHG | OXYGEN SATURATION: 95 %

## 2023-10-24 LAB
ANION GAP SERPL CALCULATED.3IONS-SCNC: 11 MMOL/L (ref 5–15)
BASOPHILS # BLD AUTO: 0 10*3/MM3 (ref 0–0.2)
BASOPHILS NFR BLD AUTO: 0.6 % (ref 0–1.5)
BH CV ECHO MEAS - ACS: 2 CM
BH CV ECHO MEAS - AI P1/2T: 718.3 MSEC
BH CV ECHO MEAS - AO MAX PG: 4 MMHG
BH CV ECHO MEAS - AO MEAN PG: 2 MMHG
BH CV ECHO MEAS - AO ROOT DIAM: 2.9 CM
BH CV ECHO MEAS - AO V2 MAX: 100 CM/SEC
BH CV ECHO MEAS - AO V2 VTI: 19.7 CM
BH CV ECHO MEAS - AVA(I,D): 2.47 CM2
BH CV ECHO MEAS - EDV(MOD-SP4): 116 ML
BH CV ECHO MEAS - EF(MOD-BP): 55 %
BH CV ECHO MEAS - EF(MOD-SP4): 54.7 %
BH CV ECHO MEAS - ESV(MOD-SP4): 52.5 ML
BH CV ECHO MEAS - IVSD: 0.9 CM
BH CV ECHO MEAS - LA DIMENSION: 3.3 CM
BH CV ECHO MEAS - LAT PEAK E' VEL: 10 CM/SEC
BH CV ECHO MEAS - LV DIASTOLIC VOL/BSA (35-75): 54.6 CM2
BH CV ECHO MEAS - LV MAX PG: 2.9 MMHG
BH CV ECHO MEAS - LV MEAN PG: 1 MMHG
BH CV ECHO MEAS - LV SYSTOLIC VOL/BSA (12-30): 24.7 CM2
BH CV ECHO MEAS - LV V1 MAX: 84.6 CM/SEC
BH CV ECHO MEAS - LV V1 VTI: 15.5 CM
BH CV ECHO MEAS - LVIDD: 5 CM
BH CV ECHO MEAS - LVIDS: 2.4 CM
BH CV ECHO MEAS - LVOT AREA: 3.1 CM2
BH CV ECHO MEAS - LVOT DIAM: 2 CM
BH CV ECHO MEAS - LVPWD: 0.9 CM
BH CV ECHO MEAS - MED PEAK E' VEL: 7.3 CM/SEC
BH CV ECHO MEAS - MR MAX PG: 17 MMHG
BH CV ECHO MEAS - MR MAX VEL: 206 CM/SEC
BH CV ECHO MEAS - MV A DUR: 0.12 SEC
BH CV ECHO MEAS - MV A MAX VEL: 57.5 CM/SEC
BH CV ECHO MEAS - MV DEC SLOPE: 328 CM/SEC2
BH CV ECHO MEAS - MV DEC TIME: 0.21 SEC
BH CV ECHO MEAS - MV E MAX VEL: 53.5 CM/SEC
BH CV ECHO MEAS - MV E/A: 0.93
BH CV ECHO MEAS - MV MAX PG: 1.56 MMHG
BH CV ECHO MEAS - MV MEAN PG: 1 MMHG
BH CV ECHO MEAS - MV P1/2T: 49.7 MSEC
BH CV ECHO MEAS - MV V2 VTI: 15.4 CM
BH CV ECHO MEAS - MVA(P1/2T): 4.4 CM2
BH CV ECHO MEAS - MVA(VTI): 3.2 CM2
BH CV ECHO MEAS - PA V2 MAX: 124 CM/SEC
BH CV ECHO MEAS - PULM A REVS DUR: 0.12 SEC
BH CV ECHO MEAS - PULM A REVS VEL: 29.6 CM/SEC
BH CV ECHO MEAS - PULM DIAS VEL: 27.3 CM/SEC
BH CV ECHO MEAS - PULM S/D: 1
BH CV ECHO MEAS - PULM SYS VEL: 27.3 CM/SEC
BH CV ECHO MEAS - RAP SYSTOLE: 3 MMHG
BH CV ECHO MEAS - RV MAX PG: 1.55 MMHG
BH CV ECHO MEAS - RV V1 MAX: 62.3 CM/SEC
BH CV ECHO MEAS - RV V1 VTI: 12 CM
BH CV ECHO MEAS - RVSP: 32 MMHG
BH CV ECHO MEAS - SI(MOD-SP4): 29.9 ML/M2
BH CV ECHO MEAS - SV(LVOT): 48.7 ML
BH CV ECHO MEAS - SV(MOD-SP4): 63.5 ML
BH CV ECHO MEAS - TAPSE (>1.6): 1.65 CM
BH CV ECHO MEAS - TR MAX PG: 28.5 MMHG
BH CV ECHO MEAS - TR MAX VEL: 267 CM/SEC
BH CV ECHO MEASUREMENTS AVERAGE E/E' RATIO: 6.18
BH CV REST NUCLEAR ISOTOPE DOSE: 11 MCI
BH CV STRESS BP STAGE 1: NORMAL
BH CV STRESS BP STAGE 2: NORMAL
BH CV STRESS BP STAGE 3: NORMAL
BH CV STRESS DURATION MIN STAGE 1: 3
BH CV STRESS DURATION MIN STAGE 2: 3
BH CV STRESS DURATION MIN STAGE 3: 3
BH CV STRESS DURATION SEC STAGE 1: 0
BH CV STRESS DURATION SEC STAGE 2: 0
BH CV STRESS DURATION SEC STAGE 3: 0
BH CV STRESS GRADE STAGE 1: 10
BH CV STRESS GRADE STAGE 2: 12
BH CV STRESS GRADE STAGE 3: 14
BH CV STRESS HR STAGE 1: 114
BH CV STRESS HR STAGE 2: 136
BH CV STRESS HR STAGE 3: 148
BH CV STRESS METS STAGE 1: 5
BH CV STRESS METS STAGE 2: 7.5
BH CV STRESS METS STAGE 3: 10
BH CV STRESS NUCLEAR ISOTOPE DOSE: 33 MCI
BH CV STRESS PROTOCOL 1: NORMAL
BH CV STRESS RECOVERY BP: NORMAL MMHG
BH CV STRESS RECOVERY HR: 101 BPM
BH CV STRESS SPEED STAGE 1: 1.7
BH CV STRESS SPEED STAGE 2: 2.5
BH CV STRESS SPEED STAGE 3: 3.4
BH CV STRESS STAGE 1: 1
BH CV STRESS STAGE 2: 2
BH CV STRESS STAGE 3: 3
BH CV XLRA - RV BASE: 2.9 CM
BH CV XLRA - RV LENGTH: 5.8 CM
BH CV XLRA - RV MID: 2.3 CM
BH CV XLRA - TDI S': 9.6 CM/SEC
BUN SERPL-MCNC: 14 MG/DL (ref 6–20)
BUN/CREAT SERPL: 17.1 (ref 7–25)
CALCIUM SPEC-SCNC: 8.9 MG/DL (ref 8.6–10.5)
CHLORIDE SERPL-SCNC: 104 MMOL/L (ref 98–107)
CHOLEST SERPL-MCNC: 166 MG/DL (ref 0–200)
CO2 SERPL-SCNC: 24 MMOL/L (ref 22–29)
CREAT SERPL-MCNC: 0.82 MG/DL (ref 0.76–1.27)
DEPRECATED RDW RBC AUTO: 41.1 FL (ref 37–54)
EGFRCR SERPLBLD CKD-EPI 2021: 107.7 ML/MIN/1.73
EOSINOPHIL # BLD AUTO: 0.2 10*3/MM3 (ref 0–0.4)
EOSINOPHIL NFR BLD AUTO: 2.5 % (ref 0.3–6.2)
ERYTHROCYTE [DISTWIDTH] IN BLOOD BY AUTOMATED COUNT: 13 % (ref 12.3–15.4)
GLUCOSE SERPL-MCNC: 98 MG/DL (ref 65–99)
HBA1C MFR BLD: 5.4 % (ref 4.8–5.6)
HCT VFR BLD AUTO: 42 % (ref 37.5–51)
HDLC SERPL-MCNC: 29 MG/DL (ref 40–60)
HGB BLD-MCNC: 14.5 G/DL (ref 13–17.7)
LDLC SERPL CALC-MCNC: 112 MG/DL (ref 0–100)
LDLC/HDLC SERPL: 3.75 {RATIO}
LEFT ATRIUM VOLUME INDEX: 20 ML/M2
LV EF NUC BP: 75 %
LYMPHOCYTES # BLD AUTO: 1.7 10*3/MM3 (ref 0.7–3.1)
LYMPHOCYTES NFR BLD AUTO: 26.4 % (ref 19.6–45.3)
MAGNESIUM SERPL-MCNC: 2.3 MG/DL (ref 1.6–2.6)
MAXIMAL PREDICTED HEART RATE: 171 BPM
MCH RBC QN AUTO: 29.7 PG (ref 26.6–33)
MCHC RBC AUTO-ENTMCNC: 34.5 G/DL (ref 31.5–35.7)
MCV RBC AUTO: 86 FL (ref 79–97)
MONOCYTES # BLD AUTO: 0.6 10*3/MM3 (ref 0.1–0.9)
MONOCYTES NFR BLD AUTO: 8.6 % (ref 5–12)
NEUTROPHILS NFR BLD AUTO: 4 10*3/MM3 (ref 1.7–7)
NEUTROPHILS NFR BLD AUTO: 61.9 % (ref 42.7–76)
NRBC BLD AUTO-RTO: 0.1 /100 WBC (ref 0–0.2)
PERCENT MAX PREDICTED HR: 86.55 %
PLATELET # BLD AUTO: 270 10*3/MM3 (ref 140–450)
PMV BLD AUTO: 8.5 FL (ref 6–12)
POTASSIUM SERPL-SCNC: 3.9 MMOL/L (ref 3.5–5.2)
QT INTERVAL: 351 MS
QTC INTERVAL: 405 MS
RBC # BLD AUTO: 4.88 10*6/MM3 (ref 4.14–5.8)
SINUS: 3.4 CM
SODIUM SERPL-SCNC: 139 MMOL/L (ref 136–145)
STJ: 3 CM
STRESS BASELINE BP: NORMAL MMHG
STRESS BASELINE HR: 75 BPM
STRESS PERCENT HR: 102 %
STRESS POST ESTIMATED WORKLOAD: 10.1 METS
STRESS POST EXERCISE DUR MIN: 7 MIN
STRESS POST EXERCISE DUR SEC: 41 SEC
STRESS POST PEAK BP: NORMAL MMHG
STRESS POST PEAK HR: 148 BPM
STRESS TARGET HR: 145 BPM
TRIGL SERPL-MCNC: 141 MG/DL (ref 0–150)
TSH SERPL DL<=0.05 MIU/L-ACNC: 1.95 UIU/ML (ref 0.27–4.2)
VLDLC SERPL-MCNC: 25 MG/DL (ref 5–40)
WBC NRBC COR # BLD: 6.4 10*3/MM3 (ref 3.4–10.8)

## 2023-10-24 PROCEDURE — 78452 HT MUSCLE IMAGE SPECT MULT: CPT

## 2023-10-24 PROCEDURE — 84443 ASSAY THYROID STIM HORMONE: CPT | Performed by: NURSE PRACTITIONER

## 2023-10-24 PROCEDURE — 93306 TTE W/DOPPLER COMPLETE: CPT

## 2023-10-24 PROCEDURE — 93306 TTE W/DOPPLER COMPLETE: CPT | Performed by: INTERNAL MEDICINE

## 2023-10-24 PROCEDURE — 80061 LIPID PANEL: CPT | Performed by: NURSE PRACTITIONER

## 2023-10-24 PROCEDURE — 0 TECHNETIUM TETROFOSMIN KIT: Performed by: EMERGENCY MEDICINE

## 2023-10-24 PROCEDURE — 83036 HEMOGLOBIN GLYCOSYLATED A1C: CPT | Performed by: NURSE PRACTITIONER

## 2023-10-24 PROCEDURE — G0378 HOSPITAL OBSERVATION PER HR: HCPCS

## 2023-10-24 PROCEDURE — 85025 COMPLETE CBC W/AUTO DIFF WBC: CPT | Performed by: NURSE PRACTITIONER

## 2023-10-24 PROCEDURE — 25010000002 SULFUR HEXAFLUORIDE MICROSPH 60.7-25 MG RECONSTITUTED SUSPENSION: Performed by: EMERGENCY MEDICINE

## 2023-10-24 PROCEDURE — 78452 HT MUSCLE IMAGE SPECT MULT: CPT | Performed by: INTERNAL MEDICINE

## 2023-10-24 PROCEDURE — 83735 ASSAY OF MAGNESIUM: CPT | Performed by: NURSE PRACTITIONER

## 2023-10-24 PROCEDURE — 93017 CV STRESS TEST TRACING ONLY: CPT

## 2023-10-24 PROCEDURE — 93018 CV STRESS TEST I&R ONLY: CPT | Performed by: INTERNAL MEDICINE

## 2023-10-24 PROCEDURE — 99214 OFFICE O/P EST MOD 30 MIN: CPT | Performed by: NURSE PRACTITIONER

## 2023-10-24 PROCEDURE — A9502 TC99M TETROFOSMIN: HCPCS | Performed by: EMERGENCY MEDICINE

## 2023-10-24 PROCEDURE — 80048 BASIC METABOLIC PNL TOTAL CA: CPT | Performed by: NURSE PRACTITIONER

## 2023-10-24 RX ORDER — FAMOTIDINE 40 MG/1
40 TABLET, FILM COATED ORAL DAILY
Qty: 30 TABLET | Refills: 0 | Status: SHIPPED | OUTPATIENT
Start: 2023-10-24

## 2023-10-24 RX ADMIN — SULFUR HEXAFLUORIDE 1 ML: KIT at 09:53

## 2023-10-24 RX ADMIN — TETROFOSMIN 1 DOSE: 1.38 INJECTION, POWDER, LYOPHILIZED, FOR SOLUTION INTRAVENOUS at 07:01

## 2023-10-24 RX ADMIN — TETROFOSMIN 1 DOSE: 1.38 INJECTION, POWDER, LYOPHILIZED, FOR SOLUTION INTRAVENOUS at 08:50

## 2023-10-24 RX ADMIN — Medication 10 ML: at 08:34

## 2023-10-24 NOTE — PLAN OF CARE
Goal Outcome Evaluation:     Problem: Adult Inpatient Plan of Care  Goal: Plan of Care Review  Outcome: Ongoing, Progressing  Goal: Patient-Specific Goal (Individualized)  Outcome: Ongoing, Progressing  Goal: Absence of Hospital-Acquired Illness or Injury  Outcome: Ongoing, Progressing  Intervention: Identify and Manage Fall Risk  Recent Flowsheet Documentation  Taken 10/24/2023 0200 by Ayana Mackey RN  Safety Promotion/Fall Prevention:   safety round/check completed   room organization consistent  Taken 10/24/2023 0000 by Ayana Mackey RN  Safety Promotion/Fall Prevention:   safety round/check completed   room organization consistent  Taken 10/23/2023 2200 by Ayana Mackey RN  Safety Promotion/Fall Prevention:   safety round/check completed   room organization consistent  Taken 10/23/2023 2049 by Ayana Mackey RN  Safety Promotion/Fall Prevention:   safety round/check completed   room organization consistent  Intervention: Prevent Skin Injury  Recent Flowsheet Documentation  Taken 10/24/2023 0000 by Ayana Mackey RN  Body Position: position changed independently  Taken 10/23/2023 2049 by Ayana Mackey RN  Body Position: position changed independently  Intervention: Prevent Infection  Recent Flowsheet Documentation  Taken 10/24/2023 0200 by Ayana Mackey RN  Infection Prevention:   hand hygiene promoted   personal protective equipment utilized   rest/sleep promoted  Taken 10/24/2023 0000 by Ayana Mackey RN  Infection Prevention:   hand hygiene promoted   personal protective equipment utilized   rest/sleep promoted  Taken 10/23/2023 2200 by Ayana Mackey RN  Infection Prevention:   hand hygiene promoted   personal protective equipment utilized   rest/sleep promoted  Taken 10/23/2023 2049 by Ayana Mackey RN  Infection Prevention:   hand hygiene promoted   personal protective equipment utilized   rest/sleep promoted  Goal: Optimal Comfort and Wellbeing  Outcome: Ongoing, Progressing  Intervention: Monitor Pain and  Promote Comfort  Recent Flowsheet Documentation  Taken 10/23/2023 2129 by Ayana Mackey RN  Pain Management Interventions: see MAR  Intervention: Provide Person-Centered Care  Recent Flowsheet Documentation  Taken 10/23/2023 2049 by Ayana Mackey RN  Trust Relationship/Rapport:   care explained   questions answered   thoughts/feelings acknowledged  Goal: Readiness for Transition of Care  Outcome: Ongoing, Progressing     Problem: Chest Pain  Goal: Resolution of Chest Pain Symptoms  Outcome: Ongoing, Progressing

## 2023-10-24 NOTE — CONSULTS
HP      Name: Tal Spring ADMIT: 10/23/2023   : 1975  PCP: Tenisha Melgar MD    MRN: [id number] LOS: 0 days   AGE/SEX: 48 y.o. male  ROOM: 114/1     Chief Complaint   Patient presents with    Chest Pain     Chest pain, numbness in left arm pain for 2 days.         Subjective        History of present illness  Tal Spring is a 48-year-old male patient who has no history of coronary artery disease, he does have dyslipidemia recently started on Lipitor, presented to the hospital due to chest discomfort.  Symptoms have been going on for couple of days, felt as tightness in his chest with some numbness in his left arm.  Denies any shortness of breath, no palpitations no syncopal episodes.    Past Medical History:   Diagnosis Date    Hyperlipidemia     Injury of back     Low back problem      Past Surgical History:   Procedure Laterality Date    CHOLECYSTECTOMY       History reviewed. No pertinent family history.  Social History     Tobacco Use    Smoking status: Never    Smokeless tobacco: Never   Vaping Use    Vaping Use: Never used   Substance Use Topics    Alcohol use: Not Currently    Drug use: Defer     Medications Prior to Admission   Medication Sig Dispense Refill Last Dose    Cholecalciferol 25 MCG (1000 UT) tablet Take 1 tablet by mouth Daily.       atorvastatin (LIPITOR) 20 MG tablet Take 1 tablet by mouth Every Night.        Allergies:  Patient has no known allergies.    Review of systems    Constitutional: Negative.    Respiratory and cardiovascular: As detailed in HPI section.  Gastrointestinal: Negative for constipation, nausea and vomiting negative for abdominal distention, abdominal pain and diarrhea.   Genitourinary: Negative for difficulty urinating and flank pain.   Musculoskeletal: Negative for arthralgias, joint swelling and myalgias.   Skin: Negative for color change, rash and wound.   Neurological: Negative for dizziness, syncope, weakness and headaches.   Hematological: Negative for  adenopathy.   Psychiatric/Behavioral: Negative for confusion.   All other systems reviewed and are negative.       Physical Exam  VITALS REVIEWED    General:      well developed, in no acute distress.    Head:      normocephalic and atraumatic.    Eyes:      PERRL/EOM intact, conjunctiva and sclera clear with out nystagmus.    Neck:      no masses, thyromegaly,  trachea central with normal respiratory effort and PMI displaced laterally  Lungs:      Clear to auscultation bilaterally  Heart:       Regular rate and rhythm  Msk:      no deformity or scoliosis noted of thoracic or lumbar spine.    Pulses:      pulses normal in all 4 extremities.    Extremities:       No lower extremity edema  Neurologic:      no focal deficits.   alert oriented x3  Skin:      intact without lesions or rashes.    Psych:      alert and cooperative; normal mood and affect; normal attention span and concentration.      Result Review :               Pertinent cardiac workup    EKG 10/23/2023 normal sinus rhythm      Assessment and Plan         Chest pain      Tal Spring is a 48-year-old male patient who presented to the hospital for chest discomfort.  He does have family history of CAD, his father passed away in his 40s with MI.  Otherwise he is not a smoker not diabetic, recently diagnosed with dyslipidemia.  Patient does not have MI, troponin negative, no ST elevation on EKG.  His symptoms are rather atypical for angina.  We will obtain a stress test and echocardiogram, if abnormal, then neck step would be to do a heart catheterization.        No follow-ups on file.  Patient was given instructions and counseling regarding his condition or for health maintenance advice. Please see specific information pulled into the AVS if appropriate.

## 2023-10-24 NOTE — PROGRESS NOTES
"[facility]   PROGRESS NOTE      Referring Provider: No att. providers found    Reason for follow-up: Chest pain     Patient Care Team:  Tenisha Melgar MD as PCP - Na James MD as Consulting Physician (Cardiology)  Dain Block MD as Consulting Physician (Neurosurgery)      SUBJECTIVE    Patient feeling better today.  Not having any chest pain or shortness of breath.       Review of Systems   Cardiovascular:  Negative for chest pain and palpitations.   Respiratory:  Negative for cough and shortness of breath.    Gastrointestinal:  Negative for nausea and vomiting.   Neurological:  Negative for dizziness and light-headedness.   All other systems reviewed and are negative.      Allergies:  Patient has no known allergies.    Personal History:    Past Medical History:   Diagnosis Date    Hyperlipidemia     Injury of back     Low back problem        Past Surgical History:   Procedure Laterality Date    CHOLECYSTECTOMY         History reviewed. No pertinent family history.    Social History     Tobacco Use    Smoking status: Never    Smokeless tobacco: Never   Vaping Use    Vaping Use: Never used   Substance Use Topics    Alcohol use: Not Currently    Drug use: Defer        No medications prior to admission.         OBJECTIVE    VITAL SIGNS  Vitals:    10/24/23 0558 10/24/23 0949 10/24/23 0952 10/24/23 1100   BP: 113/76 113/76 110/76 117/75   BP Location: Right arm   Right arm   Patient Position: Lying   Lying   Pulse: 60 60 60 75   Resp: 17   12   Temp: 98.2 °F (36.8 °C)   97.9 °F (36.6 °C)   TempSrc: Temporal   Oral   SpO2: 100%   95%   Weight:  97.1 kg (214 lb) 97.1 kg (214 lb)    Height:  175.3 cm (69\") 175.3 cm (69\")      Flowsheet Rows      Flowsheet Row First Filed Value   Admission Height 175.3 cm (69\") Documented at 10/23/2023 1347   Admission Weight 97.1 kg (214 lb) Documented at 10/23/2023 1347             TELEMETRY: Sinus rhythm    Physical Exam:  Vitals " reviewed.   Constitutional:       General: Awake.      Appearance: Healthy appearance. Overweight.   Eyes:      Pupils: Pupils are equal, round, and reactive to light.   Pulmonary:      Effort: Pulmonary effort is normal.      Breath sounds: Normal breath sounds.   Cardiovascular:      Normal rate. Regular rhythm. Normal S1. Normal S2.    Pulses:     Intact distal pulses.   Edema:     Peripheral edema absent.   Abdominal:      General: Bowel sounds are normal.   Musculoskeletal: Normal range of motion.      Cervical back: Normal range of motion. Skin:     General: Skin is warm and dry.   Neurological:      Mental Status: Alert and oriented to person, place and time.   Psychiatric:         Behavior: Behavior is cooperative.          LAB RESULTS (LAST 7 DAYS)  I have reviewed new clinical results.    CMP   Results from last 7 days   Lab Units 10/24/23  0422 10/23/23  1409   SODIUM mmol/L 139 140   POTASSIUM mmol/L 3.9 4.0   CHLORIDE mmol/L 104 106   CO2 mmol/L 24.0 25.0   BUN mg/dL 14 14   CREATININE mg/dL 0.82 0.85   GLUCOSE mg/dL 98 89   ALBUMIN g/dL  --  4.4   BILIRUBIN mg/dL  --  0.4   ALK PHOS U/L  --  56   AST (SGOT) U/L  --  16   ALT (SGPT) U/L  --  21     CBC  Results from last 7 days   Lab Units 10/24/23  0422 10/23/23  1409   WBC 10*3/mm3 6.40 6.40   RBC 10*6/mm3 4.88 5.27   HEMOGLOBIN g/dL 14.5 15.3   HEMATOCRIT % 42.0 46.7   MCV fL 86.0 88.6   PLATELETS 10*3/mm3 270 312     ProBNP      TROPONIN  Results from last 7 days   Lab Units 10/23/23  1612   HSTROP T ng/L <6     CoAg      Creatinine Clearance  Estimated Creatinine Clearance: 125.3 mL/min (by C-G formula based on SCr of 0.82 mg/dL).    Radiology  XR Chest 1 View    Result Date: 10/23/2023  Impression: No acute process identified. Electronically Signed: Adeel Moreno MD  10/23/2023 1:38 PM CDT  Workstation ID: [id number]     EKG    I personally viewed and interpreted the patient's EKG/Telemetry data:  ECG 12 Lead Chest Pain   Final Result   HEART RATE=  80  bpm   RR Interval= 752  ms   AZ Interval= 145  ms   P Horizontal Axis= 11  deg   P Front Axis= 31  deg   QRSD Interval= 85  ms   QT Interval= 351  ms   QTcB= 405  ms   QRS Axis= 7  deg   T Wave Axis= 27  deg   - NORMAL ECG -   Sinus rhythm   No previous ECG available for comparison   Electronically Signed By: Feliciano Ferrell ([place]) 24-Oct-2023 08:21:00   Date and Time of Study: 2023-10-23 13:54:41      ECG 12 Lead Chest Pain    (Results Pending)       ECHOCARDIOGRAM:  Results for orders placed during the hospital encounter of 10/23/23    ADULT TRANSTHORACIC ECHO COMPLETE W/ CONT IF NECESSARY PER PROTOCOL    Interpretation Summary    Left ventricular ejection fraction appears to be 61 - 65%.    Left ventricular diastolic function was normal.    Estimated right ventricular systolic pressure from tricuspid regurgitation is normal (<35 mmHg).      STRESS MYOVIEW:  Results for orders placed during the hospital encounter of 10/23/23    Stress Test With Myocardial Perfusion One Day    Interpretation Summary    Left ventricular ejection fraction is hyperdynamic (Calculated EF > 70%).    Low risk for ischemic heart disease.    Myocardial perfusion imaging indicates a normal myocardial perfusion study with no evidence of ischemia.    Impressions are consistent with a low risk study.      CARDIAC CATHETERIZATION:  No results found for this or any previous visit.      OTHER:     Pertinent cardiac workup     EKG 10/23/2023 normal sinus rhythm  Nuclear stress test 10/24/23 normal  Echo 10/23/23 no valvular dysfunction, EF 61-65%    ASSESSMENT/PLAN      Chest pain      PLAN  Tal Spring is a 48-year-old male patient who presented to the hospital for chest discomfort.  He does have family history of CAD, his father passed away in his 40s with MI.  Otherwise he is not a smoker not diabetic, recently diagnosed with dyslipidemia.  Patient does not have MI, troponin negative, no ST elevation on EKG.  His symptoms are rather  atypical for angina.  We will obtain a stress test and echocardiogram, if abnormal, then neck step would be to do a heart catheterization.    Stress test and echocardiogram were normal.  Patient's symptoms do not appear to be cardiac related.  However, if patient continues to have ongoing symptoms would recommend heart catheterization at that time.  Otherwise patient can follow-up with PCP.    I discussed the patients findings and my recommendations with patient and nurse.      NAGA Salvador  10/24/23  13:13 EDT  Electronically signed by NAGA Salvador, 10/24/23, 1:19 PM EDT.

## 2023-10-24 NOTE — CASE MANAGEMENT/SOCIAL WORK
Discharge Planning Assessment  [facility]     Patient Name: Tal Spring  MRN: [id number]  Today's Date: 10/24/2023    Admit Date: 10/23/2023    Plan: Return home.   Discharge Needs Assessment       Row Name 10/24/23 1106       Living Environment    People in Home alone    Current Living Arrangements home    Potentially Unsafe Housing Conditions none    Primary Care Provided by self    Provides Primary Care For no one    Family Caregiver if Needed parent(s)    Family Caregiver Names father Chuy    Quality of Family Relationships helpful;involved;supportive    Able to Return to Prior Arrangements yes       Resource/Environmental Concerns    Transportation Concerns none       Transition Planning    Patient/Family Anticipates Transition to home    Patient/Family Anticipated Services at Transition none    Transportation Anticipated car, drives self       Discharge Needs Assessment    Readmission Within the Last 30 Days no previous admission in last 30 days    Equipment Currently Used at Home cane, straight    Concerns to be Addressed denies needs/concerns at this time    Provided Post Acute Provider List? N/A    Provided Post Acute Provider Quality & Resource List? N/A                   Discharge Plan       Row Name 10/24/23 4373       Plan    Plan Return home.    Plan Comments CM met with patient at the bedside. Confirmed PCP, insurance, and pharmacy. Patient denies any difficulty affording medications. Patient is not current with any HHC/OPPT/OT services. Patient lives at home alone, is IADLS, drives self, and will drive himself home at [facility]. Denies further needs at this time. DC Barriers: cardiology following, myoview results pending.              Demographic Summary       Row Name 10/24/23 1103       General Information    Admission Type observation    Arrived From emergency department    Referral Source admission list    Reason for Consult care coordination/care conference;discharge planning    Preferred  Language English       Contact Information    Permission Granted to Share Info With     Contact Information Obtained for                    Functional Status       Row Name 10/24/23 1103       Functional Status    Usual Activity Tolerance good    Current Activity Tolerance good       Functional Status, IADL    Medications independent    Meal Preparation independent    Housekeeping independent    Laundry independent    Shopping independent                Chavo Rachel RN      Cell number [phone]  Office number [phone]

## 2023-10-24 NOTE — CASE MANAGEMENT/SOCIAL WORK
Case Management Discharge Note      Final Note: Routine home    Provided Post Acute Provider List?: N/A  Provided Post Acute Provider Quality & Resource List?: N/A    Selected Continued Care - Discharged on 10/24/2023 Admission date: 10/23/2023 - Discharge disposition: Home or Self Care       Transportation Services  Private: Car    Final Discharge Disposition Code: 01 - home or self-care

## 2023-10-24 NOTE — DISCHARGE SUMMARY
[facility]    Tenisha Melgar MD    CHIEF COMPLAINT:     cp    HISTORY OF PRESENT ILLNESS:    HPI    Er note 10/23/23 copied: Patient is a 48-year-old male who presents with intermittent chest pain worse over the last several days over the last week.  He reports fairly constant now.  He reports some radiation to his left arm.  He has a family history of early MI his father  of a cardiac heart attack at 47 years old.  In the past he seen Dr. Howard but its been at least since 2017.      10/23/23 obs note: Endorses above history.  He states he was recently diagnosed with hyperlipidemia couple weeks ago but has not yet started his statin.  He states he is also pending evaluation for sleep apnea due to daytime fatigue and snoring but has not yet had a sleep study.     Obs note 10/24/23:       Past Medical History:   Diagnosis Date    Hyperlipidemia     Injury of back     Low back problem      Past Surgical History:   Procedure Laterality Date    CHOLECYSTECTOMY       History reviewed. No pertinent family history.  Social History     Tobacco Use    Smoking status: Never    Smokeless tobacco: Never   Vaping Use    Vaping Use: Never used   Substance Use Topics    Alcohol use: Not Currently    Drug use: Defer     Medications Prior to Admission   Medication Sig Dispense Refill Last Dose    Cholecalciferol 25 MCG (1000 UT) tablet Take 1 tablet by mouth Daily.       atorvastatin (LIPITOR) 20 MG tablet Take 1 tablet by mouth Every Night.        Allergies:  Patient has no known allergies.      There is no immunization history on file for this patient.        REVIEW OF SYSTEMS:    ROS    Review of Systems   Constitutional: Negative for fever.   HENT:  Negative for congestion.    Cardiovascular:  Positive for chest pain.   Respiratory:  Negative for cough.    Musculoskeletal:  Negative for back pain.   Gastrointestinal:  Negative for abdominal pain.   Psychiatric/Behavioral:  Positive for depression.       Vital Signs  Temp:  [97.7 °F (36.5 °C)-98.2 °F (36.8 °C)] 97.9 °F (36.6 °C)  Heart Rate:  [60-87] 75  Resp:  [12-17] 12  BP: (110-152)/(62-90) 117/75          Physical Exam:  Physical Exam    Vital signs and triage nurse note reviewed.  Constitutional: Awake, alert; well-developed and well-nourished. No acute distress is noted.  HEENT: Normocephalic, atraumatic; pupils are PERRL with intact EOM; oropharynx is pink and moist without exudate or erythema.  Neck: Supple, full range of motion without pain;   Cardiovascular: Regular rate and rhythm, normal S1-S2.  Pulmonary: Respiratory effort regular nonlabored, breath sounds clear to auscultation all fields.  Abdomen: Soft, nontender nondistended with normoactive bowel sounds; no rebound or guarding.  Musculoskeletal: Independent range of motion of all extremities with no palpable tenderness or edema.  Neuro: Alert oriented x3, speech is clear and appropriate, GCS 15  Skin:  Fleshtone warm, dry, intact;        Emotional Behavior:    cooperative   Debilities:   none  Results Review:    I reviewed the patient's new clinical results.  Lab Results (most recent)       Procedure Component Value Units Date/Time    Basic Metabolic Panel [[id number]]  (Normal) Collected: 10/24/23 0422    Specimen: Blood Updated: 10/24/23 0526     Glucose 98 mg/dL      BUN 14 mg/dL      Creatinine 0.82 mg/dL      Sodium 139 mmol/L      Potassium 3.9 mmol/L      Chloride 104 mmol/L      CO2 24.0 mmol/L      Calcium 8.9 mg/dL      BUN/Creatinine Ratio 17.1     Anion Gap 11.0 mmol/L      eGFR 107.7 mL/min/1.73     Narrative:      GFR Normal >60  Chronic Kidney Disease <60  Kidney Failure <15      TSH [[id number]]  (Normal) Collected: 10/24/23 0422    Specimen: Blood Updated: 10/24/23 0526     TSH 1.950 uIU/mL     Magnesium [[id number]]  (Normal) Collected: 10/24/23 0422    Specimen: Blood Updated: 10/24/23 0526     Magnesium 2.3 mg/dL     Lipid Panel [[id number]]  (Abnormal) Collected: 10/24/23 0422     Specimen: Blood Updated: 10/24/23 0526     Total Cholesterol 166 mg/dL      Triglycerides 141 mg/dL      HDL Cholesterol 29 mg/dL      LDL Cholesterol  112 mg/dL      VLDL Cholesterol 25 mg/dL      LDL/HDL Ratio 3.75    Narrative:      Cholesterol Reference Ranges  (U.S. Department of Health and Human Services ATP III Classifications)    Desirable          <200 mg/dL  Borderline High    200-239 mg/dL  High Risk          >240 mg/dL      Triglyceride Reference Ranges  (U.S. Department of Health and Human Services ATP III Classifications)    Normal           <150 mg/dL  Borderline High  150-199 mg/dL  High             200-499 mg/dL  Very High        >500 mg/dL    HDL Reference Ranges  (U.S. Department of Health and Human Services ATP III Classifications)    Low     <40 mg/dl (major risk factor for CHD)  High    >60 mg/dl ('negative' risk factor for CHD)        LDL Reference Ranges  (U.S. Department of Health and Human Services ATP III Classifications)    Optimal          <100 mg/dL  Near Optimal     100-129 mg/dL  Borderline High  130-159 mg/dL  High             160-189 mg/dL  Very High        >189 mg/dL    Hemoglobin A1c [[id number]]  (Normal) Collected: 10/24/23 0422    Specimen: Blood Updated: 10/24/23 0518     Hemoglobin A1C 5.40 %     CBC Auto Differential [[id number]]  (Normal) Collected: 10/24/23 0422    Specimen: Blood Updated: 10/24/23 0447     WBC 6.40 10*3/mm3      RBC 4.88 10*6/mm3      Hemoglobin 14.5 g/dL      Hematocrit 42.0 %      MCV 86.0 fL      MCH 29.7 pg      MCHC 34.5 g/dL      RDW 13.0 %      RDW-SD 41.1 fl      MPV 8.5 fL      Platelets 270 10*3/mm3      Neutrophil % 61.9 %      Lymphocyte % 26.4 %      Monocyte % 8.6 %      Eosinophil % 2.5 %      Basophil % 0.6 %      Neutrophils, Absolute 4.00 10*3/mm3      Lymphocytes, Absolute 1.70 10*3/mm3      Monocytes, Absolute 0.60 10*3/mm3      Eosinophils, Absolute 0.20 10*3/mm3      Basophils, Absolute 0.00 10*3/mm3      nRBC 0.1 /100 WBC     High  Sensitivity Troponin T 2Hr [[id number]] Collected: 10/23/23 1612    Specimen: Blood Updated: 10/23/23 1654     HS Troponin T <6 ng/L      Troponin T Delta --     Comment: Unable to calculate.       Narrative:      High Sensitive Troponin T Reference Range:  <10.0 ng/L- Negative Female for AMI  <15.0 ng/L- Negative Male for AMI  >=10 - Abnormal Female indicating possible myocardial injury.  >=15 - Abnormal Male indicating possible myocardial injury.   Clinicians would have to utilize clinical acumen, EKG, Troponin, and serial changes to determine if it is an Acute Myocardial Infarction or myocardial injury due to an underlying chronic condition.         [place] Draw [[id number]] Collected: 10/23/23 1409    Specimen: Blood Updated: 10/23/23 1515    Narrative:      The following orders were created for panel order [place] Draw.  Procedure                               Abnormality         Status                     ---------                               -----------         ------                     Green Top (Gel)[[id number]]                                  Final result               Lavender Top[[id number]]                                     Final result               Gold Top - SST[[id number]]                                   Final result               Light Blue Top[[id number]]                                   Final result                 Please view results for these tests on the individual orders.    Green Top (Gel) [[id number]] Collected: 10/23/23 1409    Specimen: Blood Updated: 10/23/23 1515     Extra Tube Hold for add-ons.     Comment: Auto resulted.       Lavender Top [[id number]] Collected: 10/23/23 1409    Specimen: Blood Updated: 10/23/23 1515     Extra Tube hold for add-on     Comment: Auto resulted       Gold Top - SST [[id number]] Collected: 10/23/23 1409    Specimen: Blood Updated: 10/23/23 1515     Extra Tube Hold for add-ons.     Comment: Auto resulted.       Light Blue Top [[id number]] Collected:  10/23/23 1409    Specimen: Blood Updated: 10/23/23 1515     Extra Tube Hold for add-ons.     Comment: Auto resulted       Comprehensive Metabolic Panel [[id number]] Collected: 10/23/23 1409    Specimen: Blood Updated: 10/23/23 1441     Glucose 89 mg/dL      BUN 14 mg/dL      Creatinine 0.85 mg/dL      Sodium 140 mmol/L      Potassium 4.0 mmol/L      Chloride 106 mmol/L      CO2 25.0 mmol/L      Calcium 9.3 mg/dL      Total Protein 7.3 g/dL      Albumin 4.4 g/dL      ALT (SGPT) 21 U/L      AST (SGOT) 16 U/L      Alkaline Phosphatase 56 U/L      Total Bilirubin 0.4 mg/dL      Globulin 2.9 gm/dL      A/G Ratio 1.5 g/dL      BUN/Creatinine Ratio 16.5     Anion Gap 9.0 mmol/L      eGFR 106.5 mL/min/1.73     Narrative:      GFR Normal >60  Chronic Kidney Disease <60  Kidney Failure <15      High Sensitivity Troponin T [[id number]]  (Normal) Collected: 10/23/23 1409    Specimen: Blood Updated: 10/23/23 1441     HS Troponin T <6 ng/L     Narrative:      High Sensitive Troponin T Reference Range:  <10.0 ng/L- Negative Female for AMI  <15.0 ng/L- Negative Male for AMI  >=10 - Abnormal Female indicating possible myocardial injury.  >=15 - Abnormal Male indicating possible myocardial injury.   Clinicians would have to utilize clinical acumen, EKG, Troponin, and serial changes to determine if it is an Acute Myocardial Infarction or myocardial injury due to an underlying chronic condition.         CBC & Differential [[id number]]  (Normal) Collected: 10/23/23 1409    Specimen: Blood Updated: 10/23/23 1416    Narrative:      The following orders were created for panel order CBC & Differential.  Procedure                               Abnormality         Status                     ---------                               -----------         ------                     CBC Auto Differential[[id number]]        Normal              Final result                 Please view results for these tests on the individual orders.    CBC Auto  Differential [[id number]]  (Normal) Collected: 10/23/23 1409    Specimen: Blood Updated: 10/23/23 1416     WBC 6.40 10*3/mm3      RBC 5.27 10*6/mm3      Hemoglobin 15.3 g/dL      Hematocrit 46.7 %      MCV 88.6 fL      MCH 29.0 pg      MCHC 32.7 g/dL      RDW 12.9 %      RDW-SD 39.4 fl      MPV 8.2 fL      Platelets 312 10*3/mm3      Neutrophil % 63.5 %      Lymphocyte % 24.4 %      Monocyte % 8.9 %      Eosinophil % 2.5 %      Basophil % 0.7 %      Neutrophils, Absolute 4.10 10*3/mm3      Lymphocytes, Absolute 1.60 10*3/mm3      Monocytes, Absolute 0.60 10*3/mm3      Eosinophils, Absolute 0.20 10*3/mm3      Basophils, Absolute 0.00 10*3/mm3      nRBC 0.1 /100 WBC             Imaging Results (Most Recent)       Procedure Component Value Units Date/Time    XR Chest 1 View [[id number]] Collected: 10/23/23 1437     Updated: 10/23/23 1440    Narrative:      XR CHEST 1 VW    Date of Exam: 10/23/2023 1:34 PM CDT    Indication: Chest Pain Protocol  Chest Pain Protocol    Comparison: 1/18/2023    Findings:  Cardiomediastinal silhouette is unremarkable.  No airspace disease, pneumothorax, nor pleural effusion.No acute osseous abnormality identified.      Impression:      Impression:  No acute process identified.      Electronically Signed: Adeel Moreno MD    10/23/2023 1:38 PM CDT    Workstation ID: [id number]          reviewed    ECG/EMG Results (most recent)       Procedure Component Value Units Date/Time    ECG 12 Lead Chest Pain [[id number]] Collected: 10/23/23 1354     Updated: 10/24/23 0821     QT Interval 351 ms      QTC Interval 405 ms     Narrative:      HEART RATE= 80  bpm  RR Interval= 752  ms  AR Interval= 145  ms  P Horizontal Axis= 11  deg  P Front Axis= 31  deg  QRSD Interval= 85  ms  QT Interval= 351  ms  QTcB= 405  ms  QRS Axis= 7  deg  T Wave Axis= 27  deg  - NORMAL ECG -  Sinus rhythm  No previous ECG available for comparison  Electronically Signed By: Feliciano Ferrell ([place]) 24-Oct-2023 08:21:00  Date and Time  of Study: 2023-10-23 13:54:41    ADULT TRANSTHORACIC ECHO COMPLETE W/ CONT IF NECESSARY PER PROTOCOL [[id number]] Resulted: 10/24/23 1008     Updated: 10/24/23 1010     LV Sys Vol (BSA corrected) 24.7 cm2      LV Orozco Vol (BSA corrected) 54.6 cm2      LVOT area 3.1 cm2      LVOT diam 2.00 cm      EDV(MOD-sp4) 116.0 ml      ESV(MOD-sp4) 52.5 ml      SV(MOD-sp4) 63.5 ml      SI(MOD-sp4) 29.9 ml/m2      EF(MOD-sp4) 54.7 %      MV E max anand 53.5 cm/sec      MV A max anand 57.5 cm/sec      MV dec time 0.21 sec      MV E/A 0.93     Pulm A Revs Dur 0.12 sec      MV A dur 0.12 sec      LA ESV Index (BP) 20.0 ml/m2      Med Peak E' Anand 7.3 cm/sec      Lat Peak E' Anand 10.0 cm/sec      Avg E/e' ratio 6.18     SV(LVOT) 48.7 ml      RV Base 2.9 cm      RV Mid 2.30 cm      RV Length 5.8 cm      TAPSE (>1.6) 1.65 cm      RV S' 9.6 cm/sec      LA dimension (2D)  3.3 cm      Pulm Sys Anand 27.3 cm/sec      Pulm Orozco Anand 27.3 cm/sec      Pulm S/D 1.00     Pulm A Revs Anand 29.6 cm/sec      LV V1 max 84.6 cm/sec      LV V1 max PG 2.9 mmHg      LV V1 mean PG 1.00 mmHg      LV V1 VTI 15.5 cm      Ao pk anand 100.0 cm/sec      Ao max PG 4.0 mmHg      Ao mean PG 2.00 mmHg      Ao V2 VTI 19.7 cm      VENITA(I,D) 2.47 cm2      AI P1/2t 718.3 msec      MV max PG 1.56 mmHg      MV mean PG 1.00 mmHg      MV V2 VTI 15.4 cm      MV P1/2t 49.7 msec      MVA(P1/2t) 4.4 cm2      MVA(VTI) 3.2 cm2      MV dec slope 328.0 cm/sec2      MR max anand 206.0 cm/sec      MR max PG 17.0 mmHg      TR max anand 267.0 cm/sec      TR max PG 28.5 mmHg      RV V1 max PG 1.55 mmHg      RV V1 max 62.3 cm/sec      RV V1 VTI 12.0 cm      PA V2 max 124.0 cm/sec      Ao root diam 2.9 cm      ACS 2.00 cm      Sinus 3.4 cm      STJ 3.0 cm      EF(MOD-bp) 55.0 %      LVIDd 5.0 cm      LVIDs 2.4 cm      IVSd 0.9 cm      LVPWd 0.9 cm      RVSP(TR) 32 mmHg      RAP systole 3 mmHg     Narrative:        Left ventricular ejection fraction appears to be 61 - 65%.    Left ventricular diastolic  function was normal.    Estimated right ventricular systolic pressure from tricuspid   regurgitation is normal (<35 mmHg).            reviewed        Results for orders placed during the hospital encounter of 10/23/23    ADULT TRANSTHORACIC ECHO COMPLETE W/ CONT IF NECESSARY PER PROTOCOL    Interpretation Summary    Left ventricular ejection fraction appears to be 61 - 65%.    Left ventricular diastolic function was normal.    Estimated right ventricular systolic pressure from tricuspid regurgitation is normal (<35 mmHg).      Microbiology Results (last 10 days)       ** No results found for the last 240 hours. **            Assessment & Plan     Chest pain   Chest pain  - stress/echo/cards consult- neg stress/echo  - EKG SR  - trop neg     ? HLD  - check lipid/A1c  - continue atorvastatin     Obesity  Bmi > 30; counseled    I discussed the patients findings and my recommendations with patient      Discharge Diagnosis:      Chest pain      Hospital Course  Patient is a 48 y.o. male presented with chest pain.  Was placed in observation and underwent stress test echocardiogram and cardiology evaluation.  Enzymes and EKG were unremarkable.  Stress test and echo were negative.  Was given information for gastroenterology follow-up and rx for pepcid..    Past Medical History:     Past Medical History:   Diagnosis Date    Hyperlipidemia     Injury of back     Low back problem        Past Surgical History:     Past Surgical History:   Procedure Laterality Date    CHOLECYSTECTOMY         Social History:   Social History     Socioeconomic History    Marital status: Single   Tobacco Use    Smoking status: Never    Smokeless tobacco: Never   Vaping Use    Vaping Use: Never used   Substance and Sexual Activity    Alcohol use: Not Currently    Drug use: Defer    Sexual activity: Defer       Procedures Performed         Consults:   Consults       Date and Time Order Name Status Description    10/23/2023  4:15 PM Inpatient  Cardiology Consult              Condition on Discharge:     Stable    Discharge Disposition  Home or Self Care    Discharge Medications     Discharge Medications        New Medications        Instructions Start Date   famotidine 40 MG tablet  Commonly known as: PEPCID   40 mg, Oral, Daily             Continue These Medications        Instructions Start Date   atorvastatin 20 MG tablet  Commonly known as: LIPITOR   20 mg, Oral, Nightly      cholecalciferol 25 MCG (1000 UT) tablet  Commonly known as: VITAMIN D3   1,000 Units, Oral, Daily               Discharge Diet: regular    Activity at Discharge:  regular    Follow-up Appointments  No future appointments.        Test Results Pending at Discharge       Risk for Readmission (LACE) Score: 1 (10/24/2023  6:00 AM)          NAGA Rodriguez  10/24/23  12:00 EDT    Electronically signed by NAGA Rodriguez, 10/24/23, 12:00 PM EDT.

## 2023-11-07 ENCOUNTER — OFFICE (OUTPATIENT)
Dept: URBAN - METROPOLITAN AREA CLINIC 64 | Facility: CLINIC | Age: 48
End: 2023-11-07

## 2023-11-07 VITALS
HEIGHT: 69 IN | WEIGHT: 209 LBS | DIASTOLIC BLOOD PRESSURE: 92 MMHG | HEART RATE: 79 BPM | SYSTOLIC BLOOD PRESSURE: 125 MMHG

## 2023-11-07 DIAGNOSIS — R07.89 OTHER CHEST PAIN: ICD-10-CM

## 2023-11-07 PROCEDURE — 99213 OFFICE O/P EST LOW 20 MIN: CPT | Performed by: NURSE PRACTITIONER

## 2023-11-09 ENCOUNTER — ON CAMPUS - OUTPATIENT (OUTPATIENT)
Dept: URBAN - METROPOLITAN AREA HOSPITAL 2 | Facility: HOSPITAL | Age: 48
End: 2023-11-09

## 2023-11-09 ENCOUNTER — OFFICE (OUTPATIENT)
Dept: URBAN - METROPOLITAN AREA PATHOLOGY 4 | Facility: PATHOLOGY | Age: 48
End: 2023-11-09
Payer: COMMERCIAL

## 2023-11-09 ENCOUNTER — OFFICE (OUTPATIENT)
Dept: URBAN - METROPOLITAN AREA PATHOLOGY 4 | Facility: PATHOLOGY | Age: 48
End: 2023-11-09

## 2023-11-09 VITALS
SYSTOLIC BLOOD PRESSURE: 154 MMHG | DIASTOLIC BLOOD PRESSURE: 90 MMHG | WEIGHT: 207 LBS | DIASTOLIC BLOOD PRESSURE: 89 MMHG | DIASTOLIC BLOOD PRESSURE: 86 MMHG | RESPIRATION RATE: 16 BRPM | DIASTOLIC BLOOD PRESSURE: 75 MMHG | OXYGEN SATURATION: 100 % | HEART RATE: 87 BPM | SYSTOLIC BLOOD PRESSURE: 132 MMHG | DIASTOLIC BLOOD PRESSURE: 101 MMHG | DIASTOLIC BLOOD PRESSURE: 93 MMHG | DIASTOLIC BLOOD PRESSURE: 91 MMHG | SYSTOLIC BLOOD PRESSURE: 134 MMHG | SYSTOLIC BLOOD PRESSURE: 147 MMHG | OXYGEN SATURATION: 96 % | HEIGHT: 69 IN | HEART RATE: 76 BPM | SYSTOLIC BLOOD PRESSURE: 131 MMHG | RESPIRATION RATE: 21 BRPM | HEART RATE: 100 BPM | HEART RATE: 86 BPM | OXYGEN SATURATION: 98 % | TEMPERATURE: 97.6 F | HEART RATE: 104 BPM | DIASTOLIC BLOOD PRESSURE: 108 MMHG | DIASTOLIC BLOOD PRESSURE: 98 MMHG | HEART RATE: 75 BPM | HEART RATE: 91 BPM | SYSTOLIC BLOOD PRESSURE: 133 MMHG | DIASTOLIC BLOOD PRESSURE: 97 MMHG | RESPIRATION RATE: 19 BRPM | SYSTOLIC BLOOD PRESSURE: 138 MMHG | OXYGEN SATURATION: 97 % | SYSTOLIC BLOOD PRESSURE: 150 MMHG | SYSTOLIC BLOOD PRESSURE: 146 MMHG

## 2023-11-09 DIAGNOSIS — K22.2 ESOPHAGEAL OBSTRUCTION: ICD-10-CM

## 2023-11-09 DIAGNOSIS — D12.2 BENIGN NEOPLASM OF ASCENDING COLON: ICD-10-CM

## 2023-11-09 DIAGNOSIS — Z12.11 ENCOUNTER FOR SCREENING FOR MALIGNANT NEOPLASM OF COLON: ICD-10-CM

## 2023-11-09 DIAGNOSIS — R07.89 OTHER CHEST PAIN: ICD-10-CM

## 2023-11-09 DIAGNOSIS — D12.5 BENIGN NEOPLASM OF SIGMOID COLON: ICD-10-CM

## 2023-11-09 DIAGNOSIS — K22.5 DIVERTICULUM OF ESOPHAGUS, ACQUIRED: ICD-10-CM

## 2023-11-09 DIAGNOSIS — K31.89 OTHER DISEASES OF STOMACH AND DUODENUM: ICD-10-CM

## 2023-11-09 DIAGNOSIS — K44.9 DIAPHRAGMATIC HERNIA WITHOUT OBSTRUCTION OR GANGRENE: ICD-10-CM

## 2023-11-09 DIAGNOSIS — K29.70 GASTRITIS, UNSPECIFIED, WITHOUT BLEEDING: ICD-10-CM

## 2023-11-09 PROBLEM — K63.5 POLYP OF COLON: Status: ACTIVE | Noted: 2023-11-09

## 2023-11-09 PROBLEM — K22.89 OTHER SPECIFIED DISEASE OF ESOPHAGUS: Status: ACTIVE | Noted: 2023-11-09

## 2023-11-09 LAB
GI HISTOLOGY: A. SELECT: (no result)
GI HISTOLOGY: B. UNSPECIFIED: (no result)
GI HISTOLOGY: C. UNSPECIFIED: (no result)
GI HISTOLOGY: D. UNSPECIFIED: (no result)
GI HISTOLOGY: PDF REPORT: (no result)

## 2023-11-09 PROCEDURE — 45385 COLONOSCOPY W/LESION REMOVAL: CPT | Mod: 33 | Performed by: INTERNAL MEDICINE

## 2023-11-09 PROCEDURE — 43239 EGD BIOPSY SINGLE/MULTIPLE: CPT | Performed by: INTERNAL MEDICINE

## 2023-11-09 PROCEDURE — 88305 TISSUE EXAM BY PATHOLOGIST: CPT | Performed by: INTERNAL MEDICINE

## 2023-11-09 PROCEDURE — 43450 DILATE ESOPHAGUS 1/MULT PASS: CPT | Performed by: INTERNAL MEDICINE

## 2025-05-06 ENCOUNTER — APPOINTMENT (OUTPATIENT)
Dept: GENERAL RADIOLOGY | Facility: HOSPITAL | Age: 50
End: 2025-05-06
Payer: COMMERCIAL

## 2025-05-06 ENCOUNTER — HOSPITAL ENCOUNTER (EMERGENCY)
Facility: HOSPITAL | Age: 50
Discharge: HOME | End: 2025-05-06
Attending: EMERGENCY MEDICINE | Admitting: EMERGENCY MEDICINE
Payer: COMMERCIAL

## 2025-05-06 VITALS
TEMPERATURE: 98.2 F | HEART RATE: 72 BPM | SYSTOLIC BLOOD PRESSURE: 127 MMHG | OXYGEN SATURATION: 94 % | BODY MASS INDEX: 32.7 KG/M2 | WEIGHT: 220.8 LBS | DIASTOLIC BLOOD PRESSURE: 87 MMHG | RESPIRATION RATE: 20 BRPM | HEIGHT: 69 IN

## 2025-05-06 DIAGNOSIS — R07.89 ATYPICAL CHEST PAIN: ICD-10-CM

## 2025-05-06 DIAGNOSIS — M94.0 ACUTE COSTOCHONDRITIS: Primary | ICD-10-CM

## 2025-05-06 LAB
ALBUMIN SERPL-MCNC: 4.2 G/DL (ref 3.5–5.2)
ALBUMIN/GLOB SERPL: 1.7 G/DL
ALP SERPL-CCNC: 68 U/L (ref 39–117)
ALT SERPL W P-5'-P-CCNC: 29 U/L (ref 1–41)
ANION GAP SERPL CALCULATED.3IONS-SCNC: 9.2 MMOL/L (ref 5–15)
AST SERPL-CCNC: 20 U/L (ref 1–40)
BASOPHILS # BLD AUTO: 0.04 10*3/MM3 (ref 0–0.2)
BASOPHILS NFR BLD AUTO: 0.7 % (ref 0–1.5)
BILIRUB SERPL-MCNC: 0.5 MG/DL (ref 0–1.2)
BUN SERPL-MCNC: 12 MG/DL (ref 6–20)
BUN/CREAT SERPL: 14.5 (ref 7–25)
CALCIUM SPEC-SCNC: 9.2 MG/DL (ref 8.6–10.5)
CHLORIDE SERPL-SCNC: 102 MMOL/L (ref 98–107)
CO2 SERPL-SCNC: 26.8 MMOL/L (ref 22–29)
CREAT SERPL-MCNC: 0.83 MG/DL (ref 0.76–1.27)
DEPRECATED RDW RBC AUTO: 41.3 FL (ref 37–54)
EGFRCR SERPLBLD CKD-EPI 2021: 106 ML/MIN/1.73
EOSINOPHIL # BLD AUTO: 0.16 10*3/MM3 (ref 0–0.4)
EOSINOPHIL NFR BLD AUTO: 2.7 % (ref 0.3–6.2)
ERYTHROCYTE [DISTWIDTH] IN BLOOD BY AUTOMATED COUNT: 12.6 % (ref 12.3–15.4)
GLOBULIN UR ELPH-MCNC: 2.5 GM/DL
GLUCOSE SERPL-MCNC: 93 MG/DL (ref 65–99)
HCT VFR BLD AUTO: 47.1 % (ref 37.5–51)
HGB BLD-MCNC: 15.3 G/DL (ref 13–17.7)
IMM GRANULOCYTES # BLD AUTO: 0.01 10*3/MM3 (ref 0–0.05)
IMM GRANULOCYTES NFR BLD AUTO: 0.2 % (ref 0–0.5)
LYMPHOCYTES # BLD AUTO: 1.44 10*3/MM3 (ref 0.7–3.1)
LYMPHOCYTES NFR BLD AUTO: 24.7 % (ref 19.6–45.3)
MCH RBC QN AUTO: 28.8 PG (ref 26.6–33)
MCHC RBC AUTO-ENTMCNC: 32.5 G/DL (ref 31.5–35.7)
MCV RBC AUTO: 88.7 FL (ref 79–97)
MONOCYTES # BLD AUTO: 0.61 10*3/MM3 (ref 0.1–0.9)
MONOCYTES NFR BLD AUTO: 10.5 % (ref 5–12)
NEUTROPHILS NFR BLD AUTO: 3.56 10*3/MM3 (ref 1.7–7)
NEUTROPHILS NFR BLD AUTO: 61.2 % (ref 42.7–76)
PLATELET # BLD AUTO: 297 10*3/MM3 (ref 140–450)
PMV BLD AUTO: 9.6 FL (ref 6–12)
POTASSIUM SERPL-SCNC: 4.1 MMOL/L (ref 3.5–5.2)
PROT SERPL-MCNC: 6.7 G/DL (ref 6–8.5)
QT INTERVAL: 340 MS
QTC INTERVAL: 392 MS
RBC # BLD AUTO: 5.31 10*6/MM3 (ref 4.14–5.8)
SODIUM SERPL-SCNC: 138 MMOL/L (ref 136–145)
TROPONIN T SERPL HS-MCNC: 7 NG/L
WBC NRBC COR # BLD AUTO: 5.82 10*3/MM3 (ref 3.4–10.8)

## 2025-05-06 PROCEDURE — 71046 X-RAY EXAM CHEST 2 VIEWS: CPT

## 2025-05-06 PROCEDURE — 99283 EMERGENCY DEPT VISIT LOW MDM: CPT

## 2025-05-06 PROCEDURE — 84484 ASSAY OF TROPONIN QUANT: CPT

## 2025-05-06 PROCEDURE — 93005 ELECTROCARDIOGRAM TRACING: CPT | Performed by: EMERGENCY MEDICINE

## 2025-05-06 PROCEDURE — 93010 ELECTROCARDIOGRAM REPORT: CPT | Performed by: EMERGENCY MEDICINE

## 2025-05-06 PROCEDURE — 80053 COMPREHEN METABOLIC PANEL: CPT

## 2025-05-06 PROCEDURE — 36415 COLL VENOUS BLD VENIPUNCTURE: CPT

## 2025-05-06 PROCEDURE — 85025 COMPLETE CBC W/AUTO DIFF WBC: CPT

## 2025-05-06 PROCEDURE — 99284 EMERGENCY DEPT VISIT MOD MDM: CPT

## 2025-05-06 RX ORDER — SODIUM CHLORIDE 0.9 % (FLUSH) 0.9 %
10 SYRINGE (ML) INJECTION AS NEEDED
Status: DISCONTINUED | OUTPATIENT
Start: 2025-05-06 | End: 2025-05-06 | Stop reason: HOSPADM

## 2025-05-06 NOTE — FSED PROVIDER NOTE
Subjective   History of Present Illness  50-year-old male reports a sensation of mild shocking to his left upper chest wall over the last 3 days.  He reports that it feels like sciatica that he has had in his back.  He reports that his vital signs of all been normal as he has been monitoring them with his writing in his watch.  He is currently denying chest pain shortness of breath vomiting and diarrhea.  He reports that he is under a lot of stress but is getting ready to take a break for couple days.  He is here with his mom.  He reports that his dad had a history of cardiac disease and  from a abdominal aortic aneurysm.  The patient denies any injury or trauma to his neck or shoulders.  He reports that the sensation comes and go and that there is nothing that he can do to reproduce it or to make it stop.        Review of Systems   All other systems reviewed and are negative.      Past Medical History:   Diagnosis Date    Hyperlipidemia     Injury of back     Low back problem        No Known Allergies    Past Surgical History:   Procedure Laterality Date    CHOLECYSTECTOMY         History reviewed. No pertinent family history.    Social History     Socioeconomic History    Marital status: Single   Tobacco Use    Smoking status: Never    Smokeless tobacco: Never   Vaping Use    Vaping status: Never Used   Substance and Sexual Activity    Alcohol use: Not Currently    Drug use: Defer    Sexual activity: Defer           Objective   Physical Exam  Vitals and nursing note reviewed.   Constitutional:       General: He is not in acute distress.     Appearance: Normal appearance. He is obese. He is not toxic-appearing.   HENT:      Head: Normocephalic and atraumatic.      Nose: Nose normal.      Mouth/Throat:      Mouth: Mucous membranes are moist.      Pharynx: Oropharynx is clear.   Eyes:      Extraocular Movements: Extraocular movements intact.      Conjunctiva/sclera: Conjunctivae normal.      Pupils: Pupils are  equal, round, and reactive to light.   Cardiovascular:      Rate and Rhythm: Normal rate and regular rhythm.      Pulses: Normal pulses.      Heart sounds: Normal heart sounds.      No friction rub.      Comments: Has reproducible tenderness with palpation and pressure applied to the left upper chest wall.  Pulmonary:      Effort: Pulmonary effort is normal. No respiratory distress.      Breath sounds: Normal breath sounds. No stridor. No wheezing, rhonchi or rales.   Abdominal:      General: Abdomen is flat. Bowel sounds are normal. There is no distension.      Palpations: Abdomen is soft.      Tenderness: There is no abdominal tenderness.   Musculoskeletal:         General: Normal range of motion.      Cervical back: Normal range of motion and neck supple. No rigidity or tenderness.   Skin:     General: Skin is warm.      Capillary Refill: Capillary refill takes less than 2 seconds.   Neurological:      General: No focal deficit present.      Mental Status: He is alert and oriented to person, place, and time. Mental status is at baseline.      Sensory: No sensory deficit.      Motor: No weakness.         Procedures           ED Course  ED Course as of 05/06/25 1642   Tue May 06, 2025   1534 CBC is completely normal [WF]   1547 Chest x-ray  Impression:  No acute cardiopulmonary abnormality is identified.   [WF]   1558 High-sensitivity troponin is less than 7    CMP is completely normal [WF]   1558 Chest x-ray  Impression:  No acute cardiopulmonary abnormality is identified.   [WF]      ED Course User Index  [WF] Tej Bailey Jr., APRN                                           Medical Decision Making  Differential diagnosis would include STEMI, non-STEMI, atypical chest pain, costochondritis    Patient's EKG is unremarkable.  Chest x-ray is negative CBC and CMP are completely normal high-sensitivity troponin is negative.    I have reassessed the patient he is resting comfortably.    I offered to repeat a  second troponin however the patient is requesting discharge with a work note    I have offered the patient a muscle relaxer however he does not want this medication.    Problems Addressed:  Acute costochondritis: complicated acute illness or injury  Atypical chest pain: complicated acute illness or injury    Amount and/or Complexity of Data Reviewed  Labs: ordered.  Radiology: ordered.  ECG/medicine tests: ordered.    Risk  Prescription drug management.        Final diagnoses:   Acute costochondritis   Atypical chest pain       ED Disposition  ED Disposition       ED Disposition   Discharge    Condition   Stable    Comment   --               No follow-up provider specified.       Medication List      No changes were made to your prescriptions during this visit.

## 2025-05-06 NOTE — DISCHARGE INSTRUCTIONS
Alternate Tylenol or ibuprofen as needed for left upper chest wall discomfort    Talk to your family doctor as you may need to wear a Holter monitor if you are having sensation of palpitations    For sustained chest pain or chest pressure present to the nearest ER    As we discussed recommending alternating cool compresses or icing of the left upper chest wall along with gentle heat which are helpful to the muscles and irritated nerves.    Return to ER for worsening symptoms

## 2025-05-06 NOTE — Clinical Note
[facility] [place] [address] [place] [address] [place] [address]  Phone: [phone]    Tal Spring was seen and treated in our emergency department on 5/6/2025.  He may return to work on 05/09/2025.         Thank you for choosing [facility].    Tej Bailey Jr., NAGA

## 2025-05-12 ENCOUNTER — OFFICE VISIT (OUTPATIENT)
Dept: CARDIOLOGY | Facility: CLINIC | Age: 50
End: 2025-05-12
Payer: COMMERCIAL

## 2025-05-12 VITALS
OXYGEN SATURATION: 96 % | DIASTOLIC BLOOD PRESSURE: 90 MMHG | WEIGHT: 220 LBS | SYSTOLIC BLOOD PRESSURE: 140 MMHG | HEIGHT: 69 IN | BODY MASS INDEX: 32.58 KG/M2 | HEART RATE: 78 BPM

## 2025-05-12 DIAGNOSIS — E78.5 DYSLIPIDEMIA: ICD-10-CM

## 2025-05-12 DIAGNOSIS — R07.89 CHEST DISCOMFORT: Primary | ICD-10-CM

## 2025-05-12 PROCEDURE — 99214 OFFICE O/P EST MOD 30 MIN: CPT | Performed by: INTERNAL MEDICINE

## 2025-05-12 RX ORDER — IBUPROFEN 600 MG/1
600 TABLET, FILM COATED ORAL
COMMUNITY
Start: 2025-04-28

## 2025-05-12 RX ORDER — EMTRICITABINE AND TENOFOVIR ALAFENAMIDE 200; 25 MG/1; MG/1
1 TABLET ORAL DAILY
COMMUNITY
Start: 2025-04-14

## 2025-05-12 NOTE — PROGRESS NOTES
Encounter Date:05/12/2025      Patient ID: Tal Spring is a 50 y.o. male.    Chief Complaint:  Chest discomfort  Dyslipidemia      History of Present Illness  The patient is a pleasant 50-year-old white male was last seen in the office in 2017 was recently was at [facility] with chest discomfort described-shocking feeling in the chest without radiation of the discomfort into the neck or into the arms.  No other associated aggravating or alleviating factors.    Patient has strong family history of coronary artery disease.    Patient is not having any shortness of breath, palpitations, dizziness or syncope.  Denies having any headache ,abdominal pain ,nausea, vomiting , diarrhea constipation, loss of weight or loss of appetite.  Denies having any excessive bruising ,hematuria or blood in the stool.    Review of all systems negative except as indicated.    Reviewed ROS.  Assessment and Plan     [[[[[[[[[[[[[[[[[[[[[[[[[[[[  Impression  ============  - Chest discomfort possible angina pectoris.    -strong family history of coronary artery disease  Treadmill test is normal 2017.    Echocardiogram 10/23/2023    Left ventricular ejection fraction appears to be 61 - 65%.    Left ventricular diastolic function was normal.    Estimated right ventricular systolic pressure from tricuspid regurgitation is normal (<35 mmHg).     Stress Cardiolite test 10/23/2023    Left ventricular ejection fraction is hyperdynamic (Calculated EF > 70%).    Low risk for ischemic heart disease.    Myocardial perfusion imaging indicates a normal myocardial perfusion study with no evidence of ischemia.    Impressions are consistent with a low risk study.    -dyslipidemia recent cholesterol 211 HDL 36 and . Low-cholesterol low-fat diet     -status post cholecystectomy     -Dyslipidemia    - Strong family history of coronary artery disease     -anxiety     -IBS    - No known allergies  =============  Plan  ===========  Chest  discomfort somewhat atypical.  EKG 5/6/2025-normal.    Strong family history of coronary artery disease    Dyslipidemia-on atorvastatin.    Stress Cardiolite test  Echocardiogram    Medications were reviewed and updated.    Follow-up in the office on the same day or soon after.    Further plan will depend on patient's progress.    Reviewed and updated 5/12/2025.    ]]]]]]]]]]]]]]]]]]]]]]]]        Diagnosis Plan   1. Chest discomfort  Adult Transthoracic Echo Complete W/ Cont if Necessary Per Protocol    Stress Test With Myocardial Perfusion One Day      2. Dyslipidemia  Adult Transthoracic Echo Complete W/ Cont if Necessary Per Protocol    Stress Test With Myocardial Perfusion One Day      LAB RESULTS (LAST 7 DAYS)    CBC  Results from last 7 days   Lab Units 05/06/25  1519   WBC 10*3/mm3 5.82   RBC 10*6/mm3 5.31   HEMOGLOBIN g/dL 15.3   HEMATOCRIT % 47.1   MCV fL 88.7   PLATELETS 10*3/mm3 297       BMP  Results from last 7 days   Lab Units 05/06/25  1519   SODIUM mmol/L 138   POTASSIUM mmol/L 4.1   CHLORIDE mmol/L 102   CO2 mmol/L 26.8   BUN mg/dL 12   CREATININE mg/dL 0.83   GLUCOSE mg/dL 93       CMP   Results from last 7 days   Lab Units 05/06/25  1519   SODIUM mmol/L 138   POTASSIUM mmol/L 4.1   CHLORIDE mmol/L 102   CO2 mmol/L 26.8   BUN mg/dL 12   CREATININE mg/dL 0.83   GLUCOSE mg/dL 93   ALBUMIN g/dL 4.2   BILIRUBIN mg/dL 0.5   ALK PHOS U/L 68   AST (SGOT) U/L 20   ALT (SGPT) U/L 29         BNP        TROPONIN  Results from last 7 days   Lab Units 05/06/25  1519   HSTROP T ng/L 7       CoAg        Creatinine Clearance  Estimated Creatinine Clearance: 122.6 mL/min (by C-G formula based on SCr of 0.83 mg/dL).    ABG        Radiology  No radiology results for the last day                The following portions of the patient's history were reviewed and updated as appropriate: allergies, current medications, past family history, past medical history, past social history, past surgical history, and problem  list.    Review of Systems   Constitutional: Negative for malaise/fatigue.   Cardiovascular:  Positive for chest pain. Negative for dyspnea on exertion, leg swelling and palpitations.   Respiratory:  Negative for cough and shortness of breath.    Gastrointestinal:  Negative for abdominal pain, nausea and vomiting.   Neurological:  Positive for numbness. Negative for dizziness, headaches, light-headedness and weakness.   All other systems reviewed and are negative.        Current Outpatient Medications:     atorvastatin (LIPITOR) 20 MG tablet, Take 1 tablet by mouth Every Night., Disp: , Rfl:     Descovy 200-25 MG per tablet, Take 1 tablet by mouth Daily., Disp: , Rfl:     famotidine (PEPCID) 40 MG tablet, Take 1 tablet by mouth Daily. (Patient taking differently: Take 1 tablet by mouth As Needed.), Disp: 30 tablet, Rfl: 0    ibuprofen (ADVIL,MOTRIN) 600 MG tablet, Take 1 tablet by mouth., Disp: , Rfl:     Cholecalciferol 25 MCG (1000 UT) tablet, Take 1 tablet by mouth Daily. (Patient not taking: Reported on 2025), Disp: , Rfl:     No Known Allergies    Family History   Problem Relation Age of Onset    Heart failure Father          @ 48 in 1986       Past Surgical History:   Procedure Laterality Date    CHOLECYSTECTOMY         Past Medical History:   Diagnosis Date    Hyperlipidemia     Injury of back     Low back problem        Family History   Problem Relation Age of Onset    Heart failure Father          @ 48 in 1986       Social History     Socioeconomic History    Marital status: Single   Tobacco Use    Smoking status: Never     Passive exposure: Never    Smokeless tobacco: Never   Vaping Use    Vaping status: Never Used   Substance and Sexual Activity    Alcohol use: Not Currently    Drug use: Never    Sexual activity: Yes     Partners: Female     Birth control/protection: Condom         Procedures      Objective:       Physical Exam    /90 (BP Location: Right arm, Patient Position:  "Sitting, Cuff Size: Adult)   Pulse 78   Ht 175.3 cm (69\")   Wt 99.8 kg (220 lb)   SpO2 96%   BMI 32.49 kg/m²   The patient is alert, oriented and in no distress.    Vital signs as noted above.    Head and neck revealed no carotid bruits or jugular venous distension.  No thyromegaly or lymphadenopathy is present.    Lungs clear.  No wheezing.  Breath sounds are normal bilaterally.    Heart normal first and second heart sounds.  No murmur..  No pericardial rub is present.  No gallop is present.    Abdomen soft and nontender.  No organomegaly is present.    Extremities revealed good peripheral pulses without any pedal edema.    Skin warm and dry.    Musculoskeletal system is grossly normal.    CNS grossly normal.    Reviewed and updated.        "

## 2025-05-17 ENCOUNTER — PATIENT ROUNDING (BHMG ONLY) (OUTPATIENT)
Dept: CARDIOLOGY | Facility: CLINIC | Age: 50
End: 2025-05-17
Payer: COMMERCIAL